# Patient Record
Sex: MALE | Race: BLACK OR AFRICAN AMERICAN | NOT HISPANIC OR LATINO | Employment: OTHER | RURAL
[De-identification: names, ages, dates, MRNs, and addresses within clinical notes are randomized per-mention and may not be internally consistent; named-entity substitution may affect disease eponyms.]

---

## 2021-05-29 ENCOUNTER — OUTSIDE PLACE OF SERVICE (OUTPATIENT)
Dept: FAMILY MEDICINE | Facility: CLINIC | Age: 66
End: 2021-05-29
Payer: MEDICARE

## 2021-05-29 PROCEDURE — 99307 PR NURSING FAC CARE, SUBSEQ, IMPROVING: ICD-10-PCS | Mod: ,,, | Performed by: FAMILY MEDICINE

## 2021-05-29 PROCEDURE — 99307 SBSQ NF CARE SF MDM 10: CPT | Mod: ,,, | Performed by: FAMILY MEDICINE

## 2021-07-28 ENCOUNTER — OUTSIDE PLACE OF SERVICE (OUTPATIENT)
Dept: FAMILY MEDICINE | Facility: CLINIC | Age: 66
End: 2021-07-28
Payer: MEDICARE

## 2021-07-28 PROCEDURE — 99307 PR NURSING FAC CARE, SUBSEQ, IMPROVING: ICD-10-PCS | Mod: ,,, | Performed by: FAMILY MEDICINE

## 2021-07-28 PROCEDURE — 99307 SBSQ NF CARE SF MDM 10: CPT | Mod: ,,, | Performed by: FAMILY MEDICINE

## 2021-09-12 ENCOUNTER — OUTSIDE PLACE OF SERVICE (OUTPATIENT)
Dept: FAMILY MEDICINE | Facility: CLINIC | Age: 66
End: 2021-09-12
Payer: MEDICARE

## 2021-09-12 PROCEDURE — 99307 PR NURSING FAC CARE, SUBSEQ, IMPROVING: ICD-10-PCS | Mod: ,,, | Performed by: FAMILY MEDICINE

## 2021-09-12 PROCEDURE — 99307 SBSQ NF CARE SF MDM 10: CPT | Mod: ,,, | Performed by: FAMILY MEDICINE

## 2021-11-22 ENCOUNTER — OUTSIDE PLACE OF SERVICE (OUTPATIENT)
Dept: FAMILY MEDICINE | Facility: CLINIC | Age: 66
End: 2021-11-22
Payer: MEDICARE

## 2021-11-22 PROCEDURE — 99307 SBSQ NF CARE SF MDM 10: CPT | Mod: ,,, | Performed by: FAMILY MEDICINE

## 2021-11-22 PROCEDURE — 99307 PR NURSING FAC CARE, SUBSEQ, IMPROVING: ICD-10-PCS | Mod: ,,, | Performed by: FAMILY MEDICINE

## 2022-01-16 ENCOUNTER — OUTSIDE PLACE OF SERVICE (OUTPATIENT)
Dept: FAMILY MEDICINE | Facility: CLINIC | Age: 67
End: 2022-01-16
Payer: MEDICARE

## 2022-01-16 PROCEDURE — 99307 PR NURSING FAC CARE, SUBSEQ, IMPROVING: ICD-10-PCS | Mod: ,,, | Performed by: FAMILY MEDICINE

## 2022-01-16 PROCEDURE — 99307 SBSQ NF CARE SF MDM 10: CPT | Mod: ,,, | Performed by: FAMILY MEDICINE

## 2022-03-15 ENCOUNTER — OUTSIDE PLACE OF SERVICE (OUTPATIENT)
Dept: FAMILY MEDICINE | Facility: CLINIC | Age: 67
End: 2022-03-15
Payer: MEDICARE

## 2022-03-15 PROCEDURE — 99307 PR NURSING FAC CARE, SUBSEQ, IMPROVING: ICD-10-PCS | Mod: ,,, | Performed by: FAMILY MEDICINE

## 2022-03-15 PROCEDURE — 99307 SBSQ NF CARE SF MDM 10: CPT | Mod: ,,, | Performed by: FAMILY MEDICINE

## 2022-05-21 ENCOUNTER — OUTSIDE PLACE OF SERVICE (OUTPATIENT)
Dept: FAMILY MEDICINE | Facility: CLINIC | Age: 67
End: 2022-05-21
Payer: MEDICARE

## 2022-05-21 PROCEDURE — 99307 PR NURSING FAC CARE, SUBSEQ, IMPROVING: ICD-10-PCS | Mod: ,,, | Performed by: FAMILY MEDICINE

## 2022-05-21 PROCEDURE — 99307 SBSQ NF CARE SF MDM 10: CPT | Mod: ,,, | Performed by: FAMILY MEDICINE

## 2022-07-31 ENCOUNTER — OUTSIDE PLACE OF SERVICE (OUTPATIENT)
Dept: FAMILY MEDICINE | Facility: CLINIC | Age: 67
End: 2022-07-31
Payer: MEDICARE

## 2022-07-31 PROCEDURE — 99309 PR NURSING FAC CARE, SUBSEQ, SIGNIF COMPLIC: ICD-10-PCS | Mod: ,,, | Performed by: FAMILY MEDICINE

## 2022-07-31 PROCEDURE — 99309 SBSQ NF CARE MODERATE MDM 30: CPT | Mod: ,,, | Performed by: FAMILY MEDICINE

## 2022-09-22 ENCOUNTER — OUTSIDE PLACE OF SERVICE (OUTPATIENT)
Dept: FAMILY MEDICINE | Facility: CLINIC | Age: 67
End: 2022-09-22
Payer: MEDICARE

## 2022-09-22 PROCEDURE — 99309 PR NURSING FAC CARE, SUBSEQ, SIGNIF COMPLIC: ICD-10-PCS | Mod: ,,, | Performed by: FAMILY MEDICINE

## 2022-09-22 PROCEDURE — 99309 SBSQ NF CARE MODERATE MDM 30: CPT | Mod: ,,, | Performed by: FAMILY MEDICINE

## 2022-11-27 ENCOUNTER — OUTSIDE PLACE OF SERVICE (OUTPATIENT)
Dept: FAMILY MEDICINE | Facility: CLINIC | Age: 67
End: 2022-11-27
Payer: MEDICARE

## 2022-11-27 PROCEDURE — 99309 SBSQ NF CARE MODERATE MDM 30: CPT | Mod: ,,, | Performed by: FAMILY MEDICINE

## 2022-11-27 PROCEDURE — 99309 PR NURSING FAC CARE, SUBSEQ, SIGNIF COMPLIC: ICD-10-PCS | Mod: ,,, | Performed by: FAMILY MEDICINE

## 2023-01-18 PROBLEM — Z85.46 PERSONAL HISTORY OF PROSTATE CANCER: Status: ACTIVE | Noted: 2023-01-18

## 2023-01-18 PROBLEM — Z87.440 HISTORY OF UTI: Status: ACTIVE | Noted: 2023-01-18

## 2023-01-18 PROBLEM — R31.9 HEMATURIA: Status: ACTIVE | Noted: 2023-01-18

## 2023-01-18 PROBLEM — Z86.73 HISTORY OF CARDIOEMBOLIC CEREBROVASCULAR ACCIDENT (CVA): Status: ACTIVE | Noted: 2023-01-18

## 2023-01-30 ENCOUNTER — OUTSIDE PLACE OF SERVICE (OUTPATIENT)
Dept: FAMILY MEDICINE | Facility: CLINIC | Age: 68
End: 2023-01-30
Payer: MEDICARE

## 2023-01-30 PROCEDURE — 99309 SBSQ NF CARE MODERATE MDM 30: CPT | Mod: ,,, | Performed by: FAMILY MEDICINE

## 2023-01-30 PROCEDURE — 99309 PR NURSING FAC CARE, SUBSEQ, SIGNIF COMPLIC: ICD-10-PCS | Mod: ,,, | Performed by: FAMILY MEDICINE

## 2023-03-22 ENCOUNTER — OUTSIDE PLACE OF SERVICE (OUTPATIENT)
Dept: FAMILY MEDICINE | Facility: CLINIC | Age: 68
End: 2023-03-22
Payer: MEDICARE

## 2023-03-22 PROCEDURE — 99309 SBSQ NF CARE MODERATE MDM 30: CPT | Mod: ,,, | Performed by: FAMILY MEDICINE

## 2023-03-22 PROCEDURE — 99309 PR NURSING FAC CARE, SUBSEQ, SIGNIF COMPLIC: ICD-10-PCS | Mod: ,,, | Performed by: FAMILY MEDICINE

## 2023-04-24 PROBLEM — Z87.440 HISTORY OF UTI: Status: RESOLVED | Noted: 2023-01-18 | Resolved: 2023-04-24

## 2023-05-23 ENCOUNTER — OUTSIDE PLACE OF SERVICE (OUTPATIENT)
Dept: FAMILY MEDICINE | Facility: CLINIC | Age: 68
End: 2023-05-23
Payer: MEDICARE

## 2023-05-23 PROCEDURE — 99309 SBSQ NF CARE MODERATE MDM 30: CPT | Mod: ,,, | Performed by: FAMILY MEDICINE

## 2023-05-23 PROCEDURE — 99309 PR NURSING FAC CARE, SUBSEQ, SIGNIF COMPLIC: ICD-10-PCS | Mod: ,,, | Performed by: FAMILY MEDICINE

## 2023-06-19 ENCOUNTER — OUTSIDE PLACE OF SERVICE (OUTPATIENT)
Dept: FAMILY MEDICINE | Facility: CLINIC | Age: 68
End: 2023-06-19
Payer: MEDICARE

## 2023-06-19 PROCEDURE — 99309 PR NURSING FAC CARE, SUBSEQ, SIGNIF COMPLIC: ICD-10-PCS | Mod: ,,, | Performed by: FAMILY MEDICINE

## 2023-06-19 PROCEDURE — 99309 SBSQ NF CARE MODERATE MDM 30: CPT | Mod: ,,, | Performed by: FAMILY MEDICINE

## 2023-10-11 ENCOUNTER — HOSPITAL ENCOUNTER (INPATIENT)
Facility: HOSPITAL | Age: 68
LOS: 3 days | Discharge: NURSING, REHAB OR LONG-TERM CARE | DRG: 689 | End: 2023-10-14
Attending: FAMILY MEDICINE | Admitting: FAMILY MEDICINE
Payer: MEDICARE

## 2023-10-11 DIAGNOSIS — R55 SYNCOPE: ICD-10-CM

## 2023-10-11 DIAGNOSIS — R78.81 BACTEREMIA DUE TO ESCHERICHIA COLI: Primary | ICD-10-CM

## 2023-10-11 DIAGNOSIS — R50.9 FEVER, UNSPECIFIED FEVER CAUSE: ICD-10-CM

## 2023-10-11 DIAGNOSIS — R41.82 ALTERED MENTAL STATUS, UNSPECIFIED ALTERED MENTAL STATUS TYPE: ICD-10-CM

## 2023-10-11 DIAGNOSIS — R31.9 HEMATURIA, UNSPECIFIED TYPE: ICD-10-CM

## 2023-10-11 DIAGNOSIS — Z86.73 HISTORY OF CARDIOEMBOLIC CEREBROVASCULAR ACCIDENT (CVA): ICD-10-CM

## 2023-10-11 DIAGNOSIS — B96.20 BACTEREMIA DUE TO ESCHERICHIA COLI: Primary | ICD-10-CM

## 2023-10-11 DIAGNOSIS — R07.9 CHEST PAIN: ICD-10-CM

## 2023-10-11 DIAGNOSIS — R41.82 ALTERED MENTAL STATUS: ICD-10-CM

## 2023-10-11 PROBLEM — G40.909 SEIZURE DISORDER: Status: ACTIVE | Noted: 2023-10-11

## 2023-10-11 LAB
ANION GAP SERPL CALCULATED.3IONS-SCNC: 13 MMOL/L (ref 7–16)
BACTERIA #/AREA URNS HPF: ABNORMAL /HPF
BASOPHILS # BLD AUTO: 0.04 K/UL (ref 0–0.2)
BASOPHILS NFR BLD AUTO: 0.2 % (ref 0–1)
BILIRUB UR QL STRIP: NEGATIVE
BUN SERPL-MCNC: 22 MG/DL (ref 7–18)
BUN/CREAT SERPL: 15 (ref 6–20)
CALCIUM SERPL-MCNC: 8.7 MG/DL (ref 8.5–10.1)
CHLORIDE SERPL-SCNC: 105 MMOL/L (ref 98–107)
CLARITY UR: ABNORMAL
CO2 SERPL-SCNC: 25 MMOL/L (ref 21–32)
COLOR UR: ABNORMAL
CREAT SERPL-MCNC: 1.47 MG/DL (ref 0.7–1.3)
CRP SERPL-MCNC: 10.9 MG/DL (ref 0–0.8)
D DIMER PPP FEU-MCNC: >20 ΜG/ML (ref 0–0.47)
DIFFERENTIAL METHOD BLD: ABNORMAL
EGFR (NO RACE VARIABLE) (RUSH/TITUS): 52 ML/MIN/1.73M2
EOSINOPHIL # BLD AUTO: 0 K/UL (ref 0–0.5)
EOSINOPHIL NFR BLD AUTO: 0 % (ref 1–4)
ERYTHROCYTE [DISTWIDTH] IN BLOOD BY AUTOMATED COUNT: 15.1 % (ref 11.5–14.5)
FLUAV AG UPPER RESP QL IA.RAPID: NEGATIVE
FLUBV AG UPPER RESP QL IA.RAPID: NEGATIVE
GLUCOSE SERPL-MCNC: 121 MG/DL (ref 70–105)
GLUCOSE SERPL-MCNC: 238 MG/DL (ref 74–106)
GLUCOSE UR STRIP-MCNC: NORMAL MG/DL
HCT VFR BLD AUTO: 47.8 % (ref 40–54)
HGB BLD-MCNC: 15.5 G/DL (ref 13.5–18)
IMM GRANULOCYTES # BLD AUTO: 0.23 K/UL (ref 0–0.04)
IMM GRANULOCYTES NFR BLD: 1 % (ref 0–0.4)
KETONES UR STRIP-SCNC: NEGATIVE MG/DL
LACTATE SERPL-SCNC: 1.7 MMOL/L (ref 0.4–2)
LACTATE SERPL-SCNC: 2.1 MMOL/L (ref 0.4–2)
LACTATE SERPL-SCNC: 3.6 MMOL/L (ref 0.4–2)
LEUKOCYTE ESTERASE UR QL STRIP: ABNORMAL
LYMPHOCYTES # BLD AUTO: 0.79 K/UL (ref 1–4.8)
LYMPHOCYTES NFR BLD AUTO: 3.5 % (ref 27–41)
MAGNESIUM SERPL-MCNC: 1.8 MG/DL (ref 1.7–2.3)
MCH RBC QN AUTO: 27.8 PG (ref 27–31)
MCHC RBC AUTO-ENTMCNC: 32.4 G/DL (ref 32–36)
MCV RBC AUTO: 85.8 FL (ref 80–96)
MONOCYTES # BLD AUTO: 1.51 K/UL (ref 0–0.8)
MONOCYTES NFR BLD AUTO: 6.8 % (ref 2–6)
MPC BLD CALC-MCNC: 11.9 FL (ref 9.4–12.4)
MUCOUS, UA: ABNORMAL /LPF
NEUTROPHILS # BLD AUTO: 19.74 K/UL (ref 1.8–7.7)
NEUTROPHILS NFR BLD AUTO: 88.5 % (ref 53–65)
NITRITE UR QL STRIP: NEGATIVE
NRBC # BLD AUTO: 0 X10E3/UL
NRBC, AUTO (.00): 0 %
PH UR STRIP: 6 PH UNITS
PHOSPHATE SERPL-MCNC: 1.7 MG/DL (ref 2.5–4.5)
PLATELET # BLD AUTO: 101 K/UL (ref 150–400)
POTASSIUM SERPL-SCNC: 3.2 MMOL/L (ref 3.5–5.1)
PROT UR QL STRIP: 100
RBC # BLD AUTO: 5.57 M/UL (ref 4.6–6.2)
RBC # UR STRIP: ABNORMAL /UL
RBC #/AREA URNS HPF: >182 /HPF
SARS-COV-2 RDRP RESP QL NAA+PROBE: NEGATIVE
SODIUM SERPL-SCNC: 140 MMOL/L (ref 136–145)
SP GR UR STRIP: 1.04
UROBILINOGEN UR STRIP-ACNC: NORMAL MG/DL
WBC # BLD AUTO: 22.31 K/UL (ref 4.5–11)
WBC #/AREA URNS HPF: >182 /HPF

## 2023-10-11 PROCEDURE — 93005 ELECTROCARDIOGRAM TRACING: CPT

## 2023-10-11 PROCEDURE — 63600175 PHARM REV CODE 636 W HCPCS: Performed by: FAMILY MEDICINE

## 2023-10-11 PROCEDURE — 25000003 PHARM REV CODE 250: Performed by: FAMILY MEDICINE

## 2023-10-11 PROCEDURE — 87040 BLOOD CULTURE FOR BACTERIA: CPT | Performed by: FAMILY MEDICINE

## 2023-10-11 PROCEDURE — 93010 ELECTROCARDIOGRAM REPORT: CPT | Mod: 76,,, | Performed by: STUDENT IN AN ORGANIZED HEALTH CARE EDUCATION/TRAINING PROGRAM

## 2023-10-11 PROCEDURE — 63600175 PHARM REV CODE 636 W HCPCS: Performed by: HOSPITALIST

## 2023-10-11 PROCEDURE — 84100 ASSAY OF PHOSPHORUS: CPT | Performed by: FAMILY MEDICINE

## 2023-10-11 PROCEDURE — 99285 PR EMERGENCY DEPT VISIT,LEVEL V: ICD-10-PCS | Mod: ,,, | Performed by: FAMILY MEDICINE

## 2023-10-11 PROCEDURE — 96375 TX/PRO/DX INJ NEW DRUG ADDON: CPT

## 2023-10-11 PROCEDURE — 87428 SARSCOV & INF VIR A&B AG IA: CPT | Performed by: FAMILY MEDICINE

## 2023-10-11 PROCEDURE — 96361 HYDRATE IV INFUSION ADD-ON: CPT

## 2023-10-11 PROCEDURE — 80048 BASIC METABOLIC PNL TOTAL CA: CPT | Performed by: FAMILY MEDICINE

## 2023-10-11 PROCEDURE — 85025 COMPLETE CBC W/AUTO DIFF WBC: CPT | Performed by: FAMILY MEDICINE

## 2023-10-11 PROCEDURE — 82962 GLUCOSE BLOOD TEST: CPT

## 2023-10-11 PROCEDURE — 86140 C-REACTIVE PROTEIN: CPT | Performed by: HOSPITALIST

## 2023-10-11 PROCEDURE — 87635 SARS-COV-2 COVID-19 AMP PRB: CPT | Performed by: FAMILY MEDICINE

## 2023-10-11 PROCEDURE — 83605 ASSAY OF LACTIC ACID: CPT | Performed by: FAMILY MEDICINE

## 2023-10-11 PROCEDURE — 93010 EKG 12-LEAD: ICD-10-PCS | Mod: ,,, | Performed by: STUDENT IN AN ORGANIZED HEALTH CARE EDUCATION/TRAINING PROGRAM

## 2023-10-11 PROCEDURE — 99285 EMERGENCY DEPT VISIT HI MDM: CPT | Mod: 25

## 2023-10-11 PROCEDURE — 99285 EMERGENCY DEPT VISIT HI MDM: CPT | Mod: ,,, | Performed by: FAMILY MEDICINE

## 2023-10-11 PROCEDURE — 83735 ASSAY OF MAGNESIUM: CPT | Performed by: FAMILY MEDICINE

## 2023-10-11 PROCEDURE — 87077 CULTURE AEROBIC IDENTIFY: CPT | Performed by: FAMILY MEDICINE

## 2023-10-11 PROCEDURE — 96376 TX/PRO/DX INJ SAME DRUG ADON: CPT

## 2023-10-11 PROCEDURE — 87804 INFLUENZA ASSAY W/OPTIC: CPT | Performed by: FAMILY MEDICINE

## 2023-10-11 PROCEDURE — 25000003 PHARM REV CODE 250

## 2023-10-11 PROCEDURE — 99223 PR INITIAL HOSPITAL CARE,LEVL III: ICD-10-PCS | Mod: ,,, | Performed by: HOSPITALIST

## 2023-10-11 PROCEDURE — 99223 1ST HOSP IP/OBS HIGH 75: CPT | Mod: ,,, | Performed by: HOSPITALIST

## 2023-10-11 PROCEDURE — 87149 DNA/RNA DIRECT PROBE: CPT | Performed by: FAMILY MEDICINE

## 2023-10-11 PROCEDURE — 11000001 HC ACUTE MED/SURG PRIVATE ROOM

## 2023-10-11 PROCEDURE — 25000003 PHARM REV CODE 250: Performed by: INTERNAL MEDICINE

## 2023-10-11 PROCEDURE — 96365 THER/PROPH/DIAG IV INF INIT: CPT

## 2023-10-11 PROCEDURE — 25000003 PHARM REV CODE 250: Performed by: HOSPITALIST

## 2023-10-11 PROCEDURE — 81001 URINALYSIS AUTO W/SCOPE: CPT | Performed by: FAMILY MEDICINE

## 2023-10-11 PROCEDURE — 51702 INSERT TEMP BLADDER CATH: CPT

## 2023-10-11 PROCEDURE — 93010 ELECTROCARDIOGRAM REPORT: CPT | Mod: ,,, | Performed by: STUDENT IN AN ORGANIZED HEALTH CARE EDUCATION/TRAINING PROGRAM

## 2023-10-11 PROCEDURE — 85379 FIBRIN DEGRADATION QUANT: CPT | Performed by: HOSPITALIST

## 2023-10-11 RX ORDER — MIRTAZAPINE 7.5 MG/1
7.5 TABLET, FILM COATED ORAL NIGHTLY
Status: DISCONTINUED | OUTPATIENT
Start: 2023-10-11 | End: 2023-10-14 | Stop reason: HOSPADM

## 2023-10-11 RX ORDER — ROSUVASTATIN CALCIUM 40 MG/1
40 TABLET, COATED ORAL DAILY
COMMUNITY
Start: 2023-09-11

## 2023-10-11 RX ORDER — LORAZEPAM 2 MG/ML
1 INJECTION INTRAMUSCULAR
Status: DISCONTINUED | OUTPATIENT
Start: 2023-10-11 | End: 2023-10-14 | Stop reason: HOSPADM

## 2023-10-11 RX ORDER — ACETAMINOPHEN 500 MG
1000 TABLET ORAL
Status: DISCONTINUED | OUTPATIENT
Start: 2023-10-11 | End: 2023-10-11

## 2023-10-11 RX ORDER — CIPROFLOXACIN 250 MG/1
250 TABLET, FILM COATED ORAL 2 TIMES DAILY
Status: ON HOLD | COMMUNITY
Start: 2023-10-05 | End: 2023-10-13 | Stop reason: HOSPADM

## 2023-10-11 RX ORDER — OXYCODONE HYDROCHLORIDE 5 MG/1
5 CAPSULE ORAL EVERY 4 HOURS PRN
COMMUNITY

## 2023-10-11 RX ORDER — SODIUM CHLORIDE AND POTASSIUM CHLORIDE 150; 450 MG/100ML; MG/100ML
INJECTION, SOLUTION INTRAVENOUS CONTINUOUS
Status: DISCONTINUED | OUTPATIENT
Start: 2023-10-11 | End: 2023-10-13

## 2023-10-11 RX ORDER — PROMETHAZINE HYDROCHLORIDE 25 MG/1
25 TABLET ORAL EVERY 6 HOURS PRN
Status: DISCONTINUED | OUTPATIENT
Start: 2023-10-11 | End: 2023-10-14 | Stop reason: HOSPADM

## 2023-10-11 RX ORDER — ACETAMINOPHEN 325 MG/1
650 TABLET ORAL
Status: COMPLETED | OUTPATIENT
Start: 2023-10-11 | End: 2023-10-11

## 2023-10-11 RX ORDER — NITROFURANTOIN MACROCRYSTALS 50 MG/1
50 CAPSULE ORAL DAILY
COMMUNITY
Start: 2023-10-04 | End: 2023-10-11 | Stop reason: CLARIF

## 2023-10-11 RX ORDER — AMOXICILLIN 250 MG
1 CAPSULE ORAL DAILY
COMMUNITY

## 2023-10-11 RX ORDER — LEVETIRACETAM 500 MG/1
1000 TABLET ORAL 2 TIMES DAILY
Status: DISCONTINUED | OUTPATIENT
Start: 2023-10-11 | End: 2023-10-14 | Stop reason: HOSPADM

## 2023-10-11 RX ORDER — METHOCARBAMOL 750 MG/1
750 TABLET, FILM COATED ORAL 3 TIMES DAILY
COMMUNITY
Start: 2023-09-06

## 2023-10-11 RX ORDER — METHENAMINE HIPPURATE 1000 MG/1
1 TABLET ORAL 2 TIMES DAILY
Status: ON HOLD | COMMUNITY
Start: 2023-09-25 | End: 2023-10-13 | Stop reason: HOSPADM

## 2023-10-11 RX ORDER — ERGOCALCIFEROL 1.25 MG/1
50000 CAPSULE ORAL
COMMUNITY

## 2023-10-11 RX ORDER — LEVETIRACETAM 500 MG/5ML
1000 INJECTION, SOLUTION, CONCENTRATE INTRAVENOUS
Status: COMPLETED | OUTPATIENT
Start: 2023-10-11 | End: 2023-10-11

## 2023-10-11 RX ORDER — GLUCAGON 1 MG
1 KIT INJECTION
Status: DISCONTINUED | OUTPATIENT
Start: 2023-10-11 | End: 2023-10-14 | Stop reason: HOSPADM

## 2023-10-11 RX ORDER — IBUPROFEN 200 MG
24 TABLET ORAL
Status: DISCONTINUED | OUTPATIENT
Start: 2023-10-11 | End: 2023-10-14 | Stop reason: HOSPADM

## 2023-10-11 RX ORDER — SODIUM CHLORIDE 0.9 % (FLUSH) 0.9 %
10 SYRINGE (ML) INJECTION EVERY 12 HOURS PRN
Status: DISCONTINUED | OUTPATIENT
Start: 2023-10-11 | End: 2023-10-14 | Stop reason: HOSPADM

## 2023-10-11 RX ORDER — MIRTAZAPINE 7.5 MG/1
7.5 TABLET, FILM COATED ORAL NIGHTLY
COMMUNITY
Start: 2023-09-21

## 2023-10-11 RX ORDER — LEVETIRACETAM 1000 MG/1
1000 TABLET ORAL 2 TIMES DAILY
COMMUNITY
Start: 2023-09-27

## 2023-10-11 RX ORDER — INSULIN ASPART 100 [IU]/ML
0-10 INJECTION, SOLUTION INTRAVENOUS; SUBCUTANEOUS
Status: DISCONTINUED | OUTPATIENT
Start: 2023-10-11 | End: 2023-10-14 | Stop reason: HOSPADM

## 2023-10-11 RX ORDER — LORAZEPAM 2 MG/ML
1 INJECTION INTRAMUSCULAR
Status: COMPLETED | OUTPATIENT
Start: 2023-10-11 | End: 2023-10-11

## 2023-10-11 RX ORDER — ACETAMINOPHEN 325 MG/1
650 TABLET ORAL EVERY 4 HOURS PRN
Status: DISCONTINUED | OUTPATIENT
Start: 2023-10-11 | End: 2023-10-14 | Stop reason: HOSPADM

## 2023-10-11 RX ORDER — LORAZEPAM 2 MG/ML
2 INJECTION INTRAMUSCULAR
Status: COMPLETED | OUTPATIENT
Start: 2023-10-11 | End: 2023-10-11

## 2023-10-11 RX ORDER — NALOXONE HCL 0.4 MG/ML
0.02 VIAL (ML) INJECTION
Status: DISCONTINUED | OUTPATIENT
Start: 2023-10-11 | End: 2023-10-14 | Stop reason: HOSPADM

## 2023-10-11 RX ORDER — SODIUM CHLORIDE 9 MG/ML
INJECTION, SOLUTION INTRAVENOUS
Status: COMPLETED
Start: 2023-10-11 | End: 2023-10-11

## 2023-10-11 RX ORDER — ONDANSETRON 2 MG/ML
4 INJECTION INTRAMUSCULAR; INTRAVENOUS EVERY 8 HOURS PRN
Status: DISCONTINUED | OUTPATIENT
Start: 2023-10-11 | End: 2023-10-14 | Stop reason: HOSPADM

## 2023-10-11 RX ORDER — IBUPROFEN 200 MG
16 TABLET ORAL
Status: DISCONTINUED | OUTPATIENT
Start: 2023-10-11 | End: 2023-10-14 | Stop reason: HOSPADM

## 2023-10-11 RX ADMIN — SODIUM CHLORIDE 1000 ML: 9 INJECTION, SOLUTION INTRAVENOUS at 12:10

## 2023-10-11 RX ADMIN — ACETAMINOPHEN 650 MG: 325 TABLET ORAL at 11:10

## 2023-10-11 RX ADMIN — SODIUM CHLORIDE 500 ML: 9 INJECTION, SOLUTION INTRAVENOUS at 10:10

## 2023-10-11 RX ADMIN — POTASSIUM CHLORIDE AND SODIUM CHLORIDE: 450; 150 INJECTION, SOLUTION INTRAVENOUS at 06:10

## 2023-10-11 RX ADMIN — LORAZEPAM 1 MG: 2 INJECTION INTRAMUSCULAR; INTRAVENOUS at 12:10

## 2023-10-11 RX ADMIN — PIPERACILLIN AND TAZOBACTAM 4.5 G: 4; .5 INJECTION, POWDER, LYOPHILIZED, FOR SOLUTION INTRAVENOUS; PARENTERAL at 03:10

## 2023-10-11 RX ADMIN — SODIUM CHLORIDE 100 ML: 9 INJECTION, SOLUTION INTRAVENOUS at 12:10

## 2023-10-11 RX ADMIN — LEVETIRACETAM 1000 MG: 100 INJECTION, SOLUTION INTRAVENOUS at 12:10

## 2023-10-11 RX ADMIN — LEVETIRACETAM 1000 MG: 500 TABLET, FILM COATED ORAL at 10:10

## 2023-10-11 RX ADMIN — LORAZEPAM 2 MG: 2 INJECTION, SOLUTION INTRAMUSCULAR; INTRAVENOUS at 01:10

## 2023-10-11 RX ADMIN — MIRTAZAPINE 7.5 MG: 7.5 TABLET, FILM COATED ORAL at 10:10

## 2023-10-11 NOTE — SUBJECTIVE & OBJECTIVE
Past Medical History:   Diagnosis Date    Diabetes mellitus     GERD (gastroesophageal reflux disease)     History of UTI 01/18/2023    No urine cultures available    Hypercholesterolemia     Hypertension     Malignant neoplasm of prostate     Seizures     Stroke        Past Surgical History:   Procedure Laterality Date    INSERTION OF SUPRAPUBIC CATHETER         Review of patient's allergies indicates:  Not on File    No current facility-administered medications on file prior to encounter.     Current Outpatient Medications on File Prior to Encounter   Medication Sig    ciprofloxacin HCl (CIPRO) 250 MG tablet Take 250 mg by mouth 2 (two) times daily.    ergocalciferol (VITAMIN D2) 50,000 unit Cap Take 50,000 Units by mouth every 7 days.    levETIRAcetam (KEPPRA) 1000 MG tablet Take 1,000 mg by mouth 2 (two) times daily.    methenamine (HIPREX) 1 gram Tab Take 1 g by mouth 2 (two) times daily.    methocarbamoL (ROBAXIN) 750 MG Tab Take 750 mg by mouth 3 (three) times daily.    mirtazapine (REMERON) 7.5 MG Tab Take 7.5 mg by mouth every evening.    oxyCODONE (OXY-IR) 5 mg Cap Take 5 mg by mouth every 4 (four) hours as needed for Pain.    rosuvastatin (CRESTOR) 40 MG Tab Take 40 mg by mouth once daily.    senna-docusate 8.6-50 mg (SENNA WITH DOCUSATE SODIUM) 8.6-50 mg per tablet Take 1 tablet by mouth once daily.    [DISCONTINUED] nitrofurantoin (MACRODANTIN) 50 MG capsule Take 50 mg by mouth once daily.     Family History    None       Tobacco Use    Smoking status: Never    Smokeless tobacco: Never   Substance and Sexual Activity    Alcohol use: Never    Drug use: Never    Sexual activity: Not on file     Review of Systems   Constitutional:  Negative for appetite change, fatigue and fever.   HENT:  Negative for congestion, hearing loss and trouble swallowing.    Respiratory:  Negative for chest tightness, shortness of breath and wheezing.    Cardiovascular:  Negative for chest pain and palpitations.    Gastrointestinal:  Negative for abdominal pain, constipation and nausea.   Genitourinary:  Positive for hematuria. Negative for difficulty urinating and dysuria.   Musculoskeletal:  Positive for gait problem. Negative for back pain and neck stiffness.   Skin:  Negative for pallor and rash.   Neurological:  Negative for dizziness, speech difficulty and headaches.   Psychiatric/Behavioral:  Negative for confusion and suicidal ideas.         Lethargy  Patient able to communicate with answering questions and follows simple commands  One-word answers     Objective:     Vital Signs (Most Recent):  Temp: (!) 101.9 °F (38.8 °C) (10/11/23 1329)  Pulse: 101 (10/11/23 1548)  Resp: (!) 23 (10/11/23 1548)  BP: 94/60 (10/11/23 1548)  SpO2: 97 % (10/11/23 1548) Vital Signs (24h Range):  Temp:  [101.8 °F (38.8 °C)-101.9 °F (38.8 °C)] 101.9 °F (38.8 °C)  Pulse:  [101-151] 101  Resp:  [20-46] 23  SpO2:  [91 %-97 %] 97 %  BP: ()/() 94/60     Weight: 91.9 kg (202 lb 9.6 oz)  Body mass index is 28.26 kg/m².     Physical Exam  Vitals reviewed.   Constitutional:       General: He is sleeping. He is not in acute distress.     Appearance: He is well-developed. He is not toxic-appearing.   HENT:      Head: Normocephalic.      Nose: Nose normal.      Mouth/Throat:      Pharynx: Oropharynx is clear.   Eyes:      Extraocular Movements: Extraocular movements intact.      Pupils: Pupils are equal, round, and reactive to light.   Neck:      Thyroid: No thyroid mass.      Vascular: No carotid bruit.   Cardiovascular:      Rate and Rhythm: Normal rate and regular rhythm.      Pulses: Normal pulses.      Heart sounds: Normal heart sounds. No murmur heard.  Pulmonary:      Effort: Pulmonary effort is normal.      Breath sounds: Normal breath sounds and air entry. No wheezing.   Abdominal:      General: Bowel sounds are normal. There is no distension.      Palpations: Abdomen is soft.      Tenderness: There is no abdominal tenderness.       Comments: Suprapubic catheter in lower abdomen   Genitourinary:     Comments: Blood noted in groin area from previous bleeding but no active bleeding at time seen  Musculoskeletal:         General: Normal range of motion.      Cervical back: Neck supple. No rigidity.   Skin:     General: Skin is warm.      Coloration: Skin is not jaundiced.      Findings: No lesion.   Neurological:      General: No focal deficit present.      Mental Status: He is lethargic.      Cranial Nerves: No cranial nerve deficit.      Gait: Gait abnormal.      Comments: Fairly dense paralysis on left side    Will awake and answer questions  Answers 1 word at a time  Is in seems a little sleepy   Psychiatric:         Attention and Perception: Attention normal.         Mood and Affect: Mood normal.         Behavior: Behavior normal. Behavior is cooperative.         Thought Content: Thought content normal.         Cognition and Memory: Cognition normal.              CRANIAL NERVES     CN III, IV, VI   Pupils are equal, round, and reactive to light.       Significant Labs: All pertinent labs within the past 24 hours have been reviewed.  BMP:   Recent Labs   Lab 10/11/23  1135   *      K 3.2*      CO2 25   BUN 22*   CREATININE 1.47*   CALCIUM 8.7   MG 1.8     CBC:   Recent Labs   Lab 10/11/23  1135   WBC 22.31*   HGB 15.5   HCT 47.8   *     CMP:   Recent Labs   Lab 10/11/23  1135      K 3.2*      CO2 25   *   BUN 22*   CREATININE 1.47*   CALCIUM 8.7   ANIONGAP 13       Significant Imaging: I have reviewed all pertinent imaging results/findings within the past 24 hours.    Imaging Results              X-Ray Abdomen AP 1 View (In process)                      CT Head Without Contrast (Final result)  Result time 10/11/23 13:17:43      Final result by Ulysses Roberts II, MD (10/11/23 13:17:43)                   Impression:      No evidence of abnormality demonstrated.      Electronically signed  by: Ulysses Roberts  Date:    10/11/2023  Time:    13:17               Narrative:    EXAMINATION:  CT HEAD WITHOUT CONTRAST    CLINICAL HISTORY:  Altered mental status, nontraumatic (Ped 0-18y);    TECHNIQUE:  Axial CT imaging of the brain is performed without contrast with 3 mm increments.    CT dose reduction technique used - Dose Rite and tube current modulation.    COMPARISON:  24 March 2019    FINDINGS:  No evidence of hemorrhage, mass, mass effect, midline shift or acute infarct seen.  Chronic changes in volume loss more prominent in the right cerebral hemisphere similar to previous CT otherwise the brain parenchyma attenuation and differentiation appears stable.  The ventricles and cisterns are unchanged in caliber.  Craniotomy changes and aneurysm clip on the right appears stable.  Cranial or skull base abnormality is identified.                                       X-Ray Chest 1 View (Final result)  Result time 10/11/23 11:22:28      Final result by Ulysses Roberts II, MD (10/11/23 11:22:28)                   Impression:      Findings suggest mild cardiac decompensation and / or pneumonitis.      Electronically signed by: Ulysses Roberts  Date:    10/11/2023  Time:    11:22               Narrative:    EXAMINATION:  XR CHEST 1 VIEW    CLINICAL HISTORY:  Sepsis;    COMPARISON:  17 June 2019    TECHNIQUE:  XR CHEST 1 VIEW    FINDINGS:  The heart and mediastinum are stable in size and configuration.  The pulmonary vascularity is slightly increased with bilateral increased interstitial lung density.  No other lung infiltrates, effusions, pneumothorax or other abnormality is demonstrated.                                    Intake/Output - Last 3 Shifts         10/09 0700  10/10 0659 10/10 0700  10/11 0659 10/11 0700  10/12 0659    IV Piggyback   1000    Total Intake(mL/kg)   1000 (10.9)    Net   +1000                 Microbiology Results (last 7 days)       Procedure Component Value Units Date/Time    Urine  culture [[id number]] Collected: 10/11/23 1628    Order Status: Sent Specimen: Urine Updated: 10/11/23 1706    Rapid Influenza A/B [[id number]]  (Normal) Collected: 10/11/23 1214    Order Status: Completed Specimen: Nasopharyngeal Updated: 10/11/23 1402     Influenza A Negative     Influenza B Negative    Blood culture x two cultures. Draw prior to antibiotics [[id number]] Collected: 10/11/23 1146    Order Status: Sent Specimen: Blood Updated: 10/11/23 1148    Blood culture x two cultures. Draw prior to antibiotics [[id number]] Collected: 10/11/23 1135    Order Status: Sent Specimen: Blood Updated: 10/11/23 1141

## 2023-10-11 NOTE — H&P
[facility] - Emergency Department  Hospital Medicine  History & Physical    Patient Name: James Calero  MRN: [id number]  Patient Class: IP- Inpatient  Admission Date: 10/11/2023  Attending Physician: Bipin Montes De Oca MD   Primary Care Provider: Giovanni Wu IV, MD         Patient information was obtained from patient, relative(s), caregiver / friend, past medical records and ER records.     Subjective:     Principal Problem:<principal problem not specified>    Chief Complaint:   Chief Complaint   Patient presents with    Altered Mental Status        HPI: Chief complaint:  Altered mental status and bleeding from penis    History of present illness:      Mr. Calero referred to emergency room from [facility] with history of penile bleeding.  Had had some recent altered mental status.  Family states patient is normally able to converse well and help with self-care.  Been less talkative and more sleepy recently.  Had bleeding from penis.  Patient has history of prostate surgery for prostate cancer 20 years earlier done at this facility.  No radiation therapy apparently required.  Patient has had treatment for urinary tract infections several times recently.  Has long history of Gonzalez catheter secondary to retention.  Had split in tip of penis from longstanding Gonzalez catheter.  Followed at [facility].  Family states patient had suprapubic catheter conversion 4 months ago.  When seen in the emergency room was noted to have elevation of fever.  Bleeding from penis area which was addressed by Urology and since stopped.  Had apparent witnessed seizure in the ER.  Hospitalist service asked to admit.     Review of systems:  See above.  Family states patient is generally able to converse, recognize family members and watch TV.  He is able to feed himself.  Requires help otherwise with activities of daily living.  Had CVA with significant left-sided weakness 7 years earlier and has not been  ambulatory since.  Snores but no history of previous diagnosis of obstructive sleep apnea.  Review of systems otherwise.    Past medical history:  -cerebrovascular accident with left-sided hemiparesis 7 years earlier (patient is right-handed)  -prostate cancer treated 20 years earlier with surgery  -neurogenic bladder with longstanding Gonzalez catheter recently converted into suprapubic catheter  -recurrent / chronic UTIs  -seizure disorder, seizure manifest by staring into space and occasionally brief loss of consciousness with convulsions.  No tongue biting    Past surgical history:  Surgery for prostate cancer    Allergy to sulfa    Social history:  Nursing home placement for last 7 years  Common law wife helps with patient's decision  Patient has children    Family history:  No one with premature coronary artery disease  Sister on dialysis  Brother on dialysis and also has diabetes    Health maintenance issues:  Family states patient gets all required vaccines at the nursing home and generally gets a flu shot every year and has had all the COVID vaccinations recommended by the nursing home  Patient had COVID-19 in 2020  No prior colonoscopies  Family hospitals patient is a full code            Past Medical History:   Diagnosis Date    Diabetes mellitus     GERD (gastroesophageal reflux disease)     History of UTI 01/18/2023    No urine cultures available    Hypercholesterolemia     Hypertension     Malignant neoplasm of prostate     Seizures     Stroke        Past Surgical History:   Procedure Laterality Date    INSERTION OF SUPRAPUBIC CATHETER         Review of patient's allergies indicates:  Not on File    No current facility-administered medications on file prior to encounter.     Current Outpatient Medications on File Prior to Encounter   Medication Sig    ciprofloxacin HCl (CIPRO) 250 MG tablet Take 250 mg by mouth 2 (two) times daily.    ergocalciferol (VITAMIN D2) 50,000 unit Cap Take 50,000 Units  by mouth every 7 days.    levETIRAcetam (KEPPRA) 1000 MG tablet Take 1,000 mg by mouth 2 (two) times daily.    methenamine (HIPREX) 1 gram Tab Take 1 g by mouth 2 (two) times daily.    methocarbamoL (ROBAXIN) 750 MG Tab Take 750 mg by mouth 3 (three) times daily.    mirtazapine (REMERON) 7.5 MG Tab Take 7.5 mg by mouth every evening.    oxyCODONE (OXY-IR) 5 mg Cap Take 5 mg by mouth every 4 (four) hours as needed for Pain.    rosuvastatin (CRESTOR) 40 MG Tab Take 40 mg by mouth once daily.    senna-docusate 8.6-50 mg (SENNA WITH DOCUSATE SODIUM) 8.6-50 mg per tablet Take 1 tablet by mouth once daily.    [DISCONTINUED] nitrofurantoin (MACRODANTIN) 50 MG capsule Take 50 mg by mouth once daily.     Family History    None       Tobacco Use    Smoking status: Never    Smokeless tobacco: Never   Substance and Sexual Activity    Alcohol use: Never    Drug use: Never    Sexual activity: Not on file     Review of Systems   Constitutional:  Negative for appetite change, fatigue and fever.   HENT:  Negative for congestion, hearing loss and trouble swallowing.    Respiratory:  Negative for chest tightness, shortness of breath and wheezing.    Cardiovascular:  Negative for chest pain and palpitations.   Gastrointestinal:  Negative for abdominal pain, constipation and nausea.   Genitourinary:  Positive for hematuria. Negative for difficulty urinating and dysuria.   Musculoskeletal:  Positive for gait problem. Negative for back pain and neck stiffness.   Skin:  Negative for pallor and rash.   Neurological:  Negative for dizziness, speech difficulty and headaches.   Psychiatric/Behavioral:  Negative for confusion and suicidal ideas.         Lethargy  Patient able to communicate with answering questions and follows simple commands  One-word answers     Objective:     Vital Signs (Most Recent):  Temp: (!) 101.9 °F (38.8 °C) (10/11/23 1329)  Pulse: 101 (10/11/23 1548)  Resp: (!) 23 (10/11/23 1548)  BP: 94/60 (10/11/23  1548)  SpO2: 97 % (10/11/23 1548) Vital Signs (24h Range):  Temp:  [101.8 °F (38.8 °C)-101.9 °F (38.8 °C)] 101.9 °F (38.8 °C)  Pulse:  [101-151] 101  Resp:  [20-46] 23  SpO2:  [91 %-97 %] 97 %  BP: ()/() 94/60     Weight: 91.9 kg (202 lb 9.6 oz)  Body mass index is 28.26 kg/m².     Physical Exam  Vitals reviewed.   Constitutional:       General: He is sleeping. He is not in acute distress.     Appearance: He is well-developed. He is not toxic-appearing.   HENT:      Head: Normocephalic.      Nose: Nose normal.      Mouth/Throat:      Pharynx: Oropharynx is clear.   Eyes:      Extraocular Movements: Extraocular movements intact.      Pupils: Pupils are equal, round, and reactive to light.   Neck:      Thyroid: No thyroid mass.      Vascular: No carotid bruit.   Cardiovascular:      Rate and Rhythm: Normal rate and regular rhythm.      Pulses: Normal pulses.      Heart sounds: Normal heart sounds. No murmur heard.  Pulmonary:      Effort: Pulmonary effort is normal.      Breath sounds: Normal breath sounds and air entry. No wheezing.   Abdominal:      General: Bowel sounds are normal. There is no distension.      Palpations: Abdomen is soft.      Tenderness: There is no abdominal tenderness.      Comments: Suprapubic catheter in lower abdomen   Genitourinary:     Comments: Blood noted in groin area from previous bleeding but no active bleeding at time seen  Musculoskeletal:         General: Normal range of motion.      Cervical back: Neck supple. No rigidity.   Skin:     General: Skin is warm.      Coloration: Skin is not jaundiced.      Findings: No lesion.   Neurological:      General: No focal deficit present.      Mental Status: He is lethargic.      Cranial Nerves: No cranial nerve deficit.      Gait: Gait abnormal.      Comments: Fairly dense paralysis on left side    Will awake and answer questions  Answers 1 word at a time  Is in seems a little sleepy   Psychiatric:         Attention and  Perception: Attention normal.         Mood and Affect: Mood normal.         Behavior: Behavior normal. Behavior is cooperative.         Thought Content: Thought content normal.         Cognition and Memory: Cognition normal.              CRANIAL NERVES     CN III, IV, VI   Pupils are equal, round, and reactive to light.       Significant Labs: All pertinent labs within the past 24 hours have been reviewed.  BMP:   Recent Labs   Lab 10/11/23  1135   *      K 3.2*      CO2 25   BUN 22*   CREATININE 1.47*   CALCIUM 8.7   MG 1.8     CBC:   Recent Labs   Lab 10/11/23  1135   WBC 22.31*   HGB 15.5   HCT 47.8   *     CMP:   Recent Labs   Lab 10/11/23  1135      K 3.2*      CO2 25   *   BUN 22*   CREATININE 1.47*   CALCIUM 8.7   ANIONGAP 13       Significant Imaging: I have reviewed all pertinent imaging results/findings within the past 24 hours.    Imaging Results              X-Ray Abdomen AP 1 View (In process)                      CT Head Without Contrast (Final result)  Result time 10/11/23 13:17:43      Final result by Ulysses Roberts II, MD (10/11/23 13:17:43)                   Impression:      No evidence of abnormality demonstrated.      Electronically signed by: Ulysses Roberts  Date:    10/11/2023  Time:    13:17               Narrative:    EXAMINATION:  CT HEAD WITHOUT CONTRAST    CLINICAL HISTORY:  Altered mental status, nontraumatic (Ped 0-18y);    TECHNIQUE:  Axial CT imaging of the brain is performed without contrast with 3 mm increments.    CT dose reduction technique used - Dose Rite and tube current modulation.    COMPARISON:  24 March 2019    FINDINGS:  No evidence of hemorrhage, mass, mass effect, midline shift or acute infarct seen.  Chronic changes in volume loss more prominent in the right cerebral hemisphere similar to previous CT otherwise the brain parenchyma attenuation and differentiation appears stable.  The ventricles and cisterns are unchanged  in caliber.  Craniotomy changes and aneurysm clip on the right appears stable.  Cranial or skull base abnormality is identified.                                       X-Ray Chest 1 View (Final result)  Result time 10/11/23 11:22:28      Final result by Ulysses Roberts II, MD (10/11/23 11:22:28)                   Impression:      Findings suggest mild cardiac decompensation and / or pneumonitis.      Electronically signed by: Ulysses Roberts  Date:    10/11/2023  Time:    11:22               Narrative:    EXAMINATION:  XR CHEST 1 VIEW    CLINICAL HISTORY:  Sepsis;    COMPARISON:  17 June 2019    TECHNIQUE:  XR CHEST 1 VIEW    FINDINGS:  The heart and mediastinum are stable in size and configuration.  The pulmonary vascularity is slightly increased with bilateral increased interstitial lung density.  No other lung infiltrates, effusions, pneumothorax or other abnormality is demonstrated.                                    Intake/Output - Last 3 Shifts         10/09 0700  10/10 0659 10/10 0700  10/11 0659 10/11 0700  10/12 0659    IV Piggyback   1000    Total Intake(mL/kg)   1000 (10.9)    Net   +1000                 Microbiology Results (last 7 days)       Procedure Component Value Units Date/Time    Urine culture [[id number]] Collected: 10/11/23 1628    Order Status: Sent Specimen: Urine Updated: 10/11/23 1706    Rapid Influenza A/B [[id number]]  (Normal) Collected: 10/11/23 1214    Order Status: Completed Specimen: Nasopharyngeal Updated: 10/11/23 1402     Influenza A Negative     Influenza B Negative    Blood culture x two cultures. Draw prior to antibiotics [[id number]] Collected: 10/11/23 1146    Order Status: Sent Specimen: Blood Updated: 10/11/23 1148    Blood culture x two cultures. Draw prior to antibiotics [[id number]] Collected: 10/11/23 1135    Order Status: Sent Specimen: Blood Updated: 10/11/23 1141              Assessment/Plan:     Seizure disorder    Witnessed seizure in the emergency  department  Family is not aware of any recent seizures before   Seizures 1st started more than 15 years ago even prior to strokes  Will obtain EEG  Continue with patient's Keppra  I have discussed with family that we do not have neurologist and if seizures become uncontrolled patient may need to be transferred  Family were okay with patient being admitted here    History of cardioembolic cerebrovascular accident (CVA)    Left-sided weakness  Nonambulatory  (patient is right-handed)    Hematuria    Addressed by Urology    Personal history of prostate cancer    Treated more than 20 years ago by surgery at this facility      VTE Risk Mitigation (From admission, onward)         Ordered     IP VTE LOW RISK PATIENT  Once         10/11/23 1738     Place sequential compression device  Until discontinued         10/11/23 1738                           Bipin Montes De Oca MD  Department of Hospital Medicine  [facility] - Emergency Department

## 2023-10-11 NOTE — ED PROVIDER NOTES
Encounter Date: 10/11/2023       History     Chief Complaint   Patient presents with    Altered Mental Status     Patient is see stitch years old who comes in from nursing home facility altered mental status.  Seems that he is having a focal seizure mainly on the left.  He is able to talk and converse while he is having the focal seizure.  He is on Keppra at present.  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  He has fever 101.9 tachycardia.  Some bleeding from his hypospadias and a suprapubic catheter.        Review of patient's allergies indicates:   Allergen Reactions    Sulfadiazine (bulk)      Past Medical History:   Diagnosis Date    Diabetes mellitus     GERD (gastroesophageal reflux disease)     History of UTI 01/18/2023    No urine cultures available    Hypercholesterolemia     Hypertension     Malignant neoplasm of prostate     Seizures     Stroke      Past Surgical History:   Procedure Laterality Date    INSERTION OF SUPRAPUBIC CATHETER       History reviewed. No pertinent family history.  Social History     Tobacco Use    Smoking status: Never    Smokeless tobacco: Never   Substance Use Topics    Alcohol use: Never    Drug use: Never     Review of Systems   Constitutional:  Positive for fatigue. Negative for fever.   HENT: Negative.  Negative for sore throat.    Eyes: Negative.    Respiratory: Negative.  Negative for shortness of breath.    Cardiovascular: Negative.  Negative for chest pain.   Gastrointestinal: Negative.  Negative for nausea.   Endocrine: Negative.    Genitourinary: Negative.  Negative for dysuria.   Musculoskeletal: Negative.  Negative for back pain.   Skin: Negative.  Negative for rash.   Allergic/Immunologic: Negative.    Neurological:  Positive for seizures. Negative for weakness.        Focal seizure activity fevers.   Hematological: Negative.  Does not bruise/bleed easily.   Psychiatric/Behavioral: Negative.         Physical  Exam     Initial Vitals [10/11/23 1048]   BP Pulse Resp Temp SpO2   (!) 166/108 (!) 151 (!) 43 (!) 101.8 °F (38.8 °C) (!) 91 %      MAP       --         Physical Exam    Constitutional: He appears well-developed and well-nourished.   HENT:   Head: Normocephalic and atraumatic.   Right Ear: External ear normal.   Left Ear: External ear normal.   Nose: Nose normal.   Mouth/Throat: Oropharynx is clear and moist.   Eyes: Conjunctivae and EOM are normal. Pupils are equal, round, and reactive to light.   Neck: Neck supple.   Normal range of motion.  Cardiovascular:  Normal rate, regular rhythm, normal heart sounds and intact distal pulses.           Pulmonary/Chest: Breath sounds normal.   Abdominal: Abdomen is soft. Bowel sounds are normal.   Genitourinary:    Prostate and penis normal.      Genitourinary Comments: Patient with profuse bleeding from the proximal in the hypospadias.  His wife states that he is had to have this repaired in [place] at 1 point.     Musculoskeletal:         General: Normal range of motion.      Cervical back: Normal range of motion and neck supple.     Neurological: He is alert and oriented to person, place, and time. He has normal strength and normal reflexes.   Skin: Skin is warm and dry.   Psychiatric: He has a normal mood and affect. His behavior is normal. Judgment and thought content normal.         Medical Screening Exam   See Full Note    ED Course   Procedures  Labs Reviewed   LACTIC ACID, PLASMA - Abnormal; Notable for the following components:       Result Value    Lactic Acid 3.6 (*)     All other components within normal limits   PHOSPHORUS - Abnormal; Notable for the following components:    Phosphorus 1.7 (*)     All other components within normal limits   URINALYSIS, REFLEX TO URINE CULTURE - Abnormal; Notable for the following components:    Color, UA Dark-Red (*)     Leukocytes, UA Large (*)     Protein,  (*)     Blood, UA Large (*)     Specific Gravity, UA 1.038  (*)     All other components within normal limits   CBC WITH DIFFERENTIAL - Abnormal; Notable for the following components:    WBC 22.31 (*)     RDW 15.1 (*)     Platelet Count 101 (*)     Neutrophils % 88.5 (*)     Lymphocytes % 3.5 (*)     Monocytes % 6.8 (*)     Eosinophils % 0.0 (*)     Immature Granulocytes % 1.0 (*)     Neutrophils, Abs 19.74 (*)     Lymphocytes, Absolute 0.79 (*)     Monocytes, Absolute 1.51 (*)     Immature Granulocytes, Absolute 0.23 (*)     All other components within normal limits   BASIC METABOLIC PANEL - Abnormal; Notable for the following components:    Potassium 3.2 (*)     Glucose 238 (*)     BUN 22 (*)     Creatinine 1.47 (*)     eGFR 52 (*)     All other components within normal limits   LACTIC ACID, PLASMA - Abnormal; Notable for the following components:    Lactic Acid 2.1 (*)     All other components within normal limits   URINALYSIS, MICROSCOPIC - Abnormal; Notable for the following components:    WBC, UA >182 (*)     RBC, UA >182 (*)     Bacteria, UA Few (*)     Mucous Occasional (*)     All other components within normal limits   C-REACTIVE PROTEIN - Abnormal; Notable for the following components:    CRP 10.90 (*)     All other components within normal limits   POCT GLUCOSE MONITORING CONTINUOUS - Abnormal; Notable for the following components:    POC Glucose 121 (*)     All other components within normal limits   RAPID INFLUENZA A/B - Normal   MAGNESIUM - Normal   SARS-COV-2 RNA AMPLIFICATION, QUAL - Normal    Narrative:     Negative SARS-CoV results should not be used as the sole basis for treatment or patient management decisions; negative results should be considered in the context of a patient's recent exposures, history and the presene of clinical signs and symptoms consistent with COVID-19.  Negative results should be treated as presumptive and confirmed by molecular assay, if necessary for patient management.   LACTIC ACID, PLASMA - Normal   CBC W/ AUTO DIFFERENTIAL     Narrative:     The following orders were created for panel order CBC auto differential.  Procedure                               Abnormality         Status                     ---------                               -----------         ------                     CBC with Differential[[id number]]       Abnormal            Final result                 Please view results for these tests on the individual orders.   POCT GLUCOSE MONITORING CONTINUOUS          Imaging Results              X-Ray Abdomen AP 1 View (Final result)  Result time 10/11/23 19:02:51      Final result by Ulysses Roberts II, MD (10/11/23 19:02:51)                   Impression:      Increased stool volume in the colon, may indicate constipation.      Electronically signed by: Ulysses Roberts  Date:    10/11/2023  Time:    19:02               Narrative:    EXAMINATION:  XR ABDOMEN AP 1 VIEW    CLINICAL HISTORY:  Constipation;    COMPARISON:  30 October 2019    FINDINGS:  No free fluid or free air seen.  Increased stool volume is seen in the colon.  The bowel gas pattern otherwise appears within normal limits.  No abnormal calcifications are present.  No other abnormality is identified.                                       CT Head Without Contrast (Final result)  Result time 10/11/23 13:17:43      Final result by Ulysses Roberts II, MD (10/11/23 13:17:43)                   Impression:      No evidence of abnormality demonstrated.      Electronically signed by: Ulysses Roberts  Date:    10/11/2023  Time:    13:17               Narrative:    EXAMINATION:  CT HEAD WITHOUT CONTRAST    CLINICAL HISTORY:  Altered mental status, nontraumatic (Ped 0-18y);    TECHNIQUE:  Axial CT imaging of the brain is performed without contrast with 3 mm increments.    CT dose reduction technique used - Dose Rite and tube current modulation.    COMPARISON:  24 March 2019    FINDINGS:  No evidence of hemorrhage, mass, mass effect, midline shift or acute infarct  seen.  Chronic changes in volume loss more prominent in the right cerebral hemisphere similar to previous CT otherwise the brain parenchyma attenuation and differentiation appears stable.  The ventricles and cisterns are unchanged in caliber.  Craniotomy changes and aneurysm clip on the right appears stable.  Cranial or skull base abnormality is identified.                                       X-Ray Chest 1 View (Final result)  Result time 10/11/23 11:22:28      Final result by Ulysses Roberts II, MD (10/11/23 11:22:28)                   Impression:      Findings suggest mild cardiac decompensation and / or pneumonitis.      Electronically signed by: Ulysses Roberts  Date:    10/11/2023  Time:    11:22               Narrative:    EXAMINATION:  XR CHEST 1 VIEW    CLINICAL HISTORY:  Sepsis;    COMPARISON:  17 June 2019    TECHNIQUE:  XR CHEST 1 VIEW    FINDINGS:  The heart and mediastinum are stable in size and configuration.  The pulmonary vascularity is slightly increased with bilateral increased interstitial lung density.  No other lung infiltrates, effusions, pneumothorax or other abnormality is demonstrated.                                       Medications   levETIRAcetam tablet 1,000 mg (1,000 mg Oral Given 10/12/23 1052)   mirtazapine tablet 7.5 mg (7.5 mg Oral Given 10/11/23 2225)   sodium chloride 0.9% flush 10 mL (has no administration in time range)   naloxone 0.4 mg/mL injection 0.02 mg (has no administration in time range)   acetaminophen tablet 650 mg (has no administration in time range)   ondansetron injection 4 mg (has no administration in time range)   promethazine tablet 25 mg (has no administration in time range)   LORazepam injection 1 mg (has no administration in time range)   glucose chewable tablet 16 g (has no administration in time range)   glucose chewable tablet 24 g (has no administration in time range)   glucagon (human recombinant) injection 1 mg (has no administration in time  range)   insulin aspart U-100 injection 0-10 Units (0 Units Subcutaneous Not Given 10/12/23 1217)   dextrose 10% bolus 125 mL 125 mL (has no administration in time range)   dextrose 10% bolus 250 mL 250 mL (has no administration in time range)   0.45% NaCl with KCl 20 mEq infusion (0 mL/hr Intravenous Paused 10/12/23 1050)   meropenem (MERREM) 1 g in sodium chloride 0.9 % 100 mL IVPB (MB+) (0 g Intravenous Stopped 10/12/23 1215)   acetaminophen tablet 650 mg (650 mg Oral Given 10/11/23 1130)   sodium chloride 0.9% bolus 1,000 mL 1,000 mL (0 mLs Intravenous Stopped 10/11/23 1300)   LORazepam injection 1 mg (1 mg Intravenous Given 10/11/23 1207)   levETIRAcetam injection 1,000 mg (1,000 mg Intravenous Given 10/11/23 1209)   sodium chloride 0.9% infusion (0 mL/hr  Stopped 10/11/23 1239)   piperacillin-tazobactam (ZOSYN) 4.5 g in dextrose 5 % in water (D5W) 100 mL IVPB (MB+) (0 g Intravenous Stopped 10/11/23 1610)   LORazepam injection 2 mg (2 mg Intravenous Given 10/11/23 1300)   sodium chloride 0.9% bolus 500 mL 500 mL (0 mLs Intravenous Stopped 10/11/23 2345)     Medical Decision Making  Amount and/or Complexity of Data Reviewed  Labs: ordered.  Radiology: ordered.    Risk  OTC drugs.  Prescription drug management.  Decision regarding hospitalization.                          Medical Decision Making:   Initial Assessment:   Patient comes in with fever underlying sepsis.  Suprapubic catheter.  Bleeding from the hypospadias region of the penis.  And focal seizure-like activity  Differential Diagnosis:   Discussed this case with Dr. Malloy he will be coming to evaluate the bleeding of the proximal penis.  I replaced the Gonzalez catheter in the suprapubic region.  Patient tolerated procedure well this was all done under sterile technique patient was started on Zosyn.  Blood cultures had been obtained still waiting on urine culture discussed with weight who stated that the urine culture was more likely contaminated anyway  since he had a suprapubic catheter.  ED Management:  After a L ns  and Keppra decreasing the temperature the patient has focal seizures have resolved--      Clinical Impression:   Final diagnoses:  [R41.82] Altered mental status, unspecified altered mental status type (Primary)  [R50.9] Fever, unspecified fever cause  [R41.82] Altered mental status        ED Disposition Condition    Admit                 Brett Joseph, DO  10/12/23 2795

## 2023-10-11 NOTE — HPI
Chief complaint:  Altered mental status and bleeding from penis    History of present illness:      Mr. Calero referred to emergency room from [facility] with history of penile bleeding.  Had had some recent altered mental status.  Family states patient is normally able to converse well and help with self-care.  Been less talkative and more sleepy recently.  Had bleeding from penis.  Patient has history of prostate surgery for prostate cancer 20 years earlier done at this facility.  No radiation therapy apparently required.  Patient has had treatment for urinary tract infections several times recently.  Has long history of Gonzalez catheter secondary to retention.  Had split in tip of penis from longstanding Gonzalez catheter.  Followed at [facility].  Family states patient had suprapubic catheter conversion 4 months ago.  When seen in the emergency room was noted to have elevation of fever.  Bleeding from penis area which was addressed by Urology and since stopped.  Had apparent witnessed seizure in the ER.  Hospitalist service asked to admit.     Review of systems:  See above.  Family states patient is generally able to converse, recognize family members and watch TV.  He is able to feed himself.  Requires help otherwise with activities of daily living.  Had CVA with significant left-sided weakness 7 years earlier and has not been ambulatory since.  Snores but no history of previous diagnosis of obstructive sleep apnea.  Review of systems otherwise.    Past medical history:  -cerebrovascular accident with left-sided hemiparesis 7 years earlier (patient is right-handed)  -prostate cancer treated 20 years earlier with surgery  -neurogenic bladder with longstanding Gonzalez catheter recently converted into suprapubic catheter  -recurrent / chronic UTIs  -seizure disorder, seizure manifest by staring into space and occasionally brief loss of consciousness with convulsions.  No tongue biting    Past surgical  history:  Surgery for prostate cancer    Allergy to sulfa    Social history:  Nursing home placement for last 7 years  Common law wife helps with patient's decision  Patient has children    Family history:  No one with premature coronary artery disease  Sister on dialysis  Brother on dialysis and also has diabetes    Health maintenance issues:  Family states patient gets all required vaccines at the nursing home and generally gets a flu shot every year and has had all the COVID vaccinations recommended by the nursing home  Patient had COVID-19 in 2020  No prior colonoscopies  [facility] patient is a full code

## 2023-10-11 NOTE — ED TRIAGE NOTES
Presents to the ED via EMS from [facility] for AMS and HTN that started about 2 hours ago and for hematuria that started yesterday.

## 2023-10-11 NOTE — ASSESSMENT & PLAN NOTE
Witnessed seizure in the emergency department  Family is not aware of any recent seizures before   Seizures 1st started more than 15 years ago even prior to strokes  Will obtain EEG  Continue with patient's Keppra  I have discussed with family that we do not have neurologist and if seizures become uncontrolled patient may need to be transferred  Family were okay with patient being admitted here

## 2023-10-12 PROBLEM — G93.41 ENCEPHALOPATHY, METABOLIC: Status: ACTIVE | Noted: 2023-10-12

## 2023-10-12 PROBLEM — R78.81 BACTEREMIA DUE TO ESCHERICHIA COLI: Status: ACTIVE | Noted: 2023-10-12

## 2023-10-12 PROBLEM — R41.82 ALTERED MENTAL STATUS: Status: ACTIVE | Noted: 2023-10-12

## 2023-10-12 PROBLEM — B96.20 BACTEREMIA DUE TO ESCHERICHIA COLI: Status: ACTIVE | Noted: 2023-10-12

## 2023-10-12 LAB
25(OH)D3 SERPL-MCNC: 58 NG/ML
ALBUMIN SERPL BCP-MCNC: 2.6 G/DL (ref 3.5–5)
ALP SERPL-CCNC: 65 U/L (ref 45–115)
ALT SERPL W P-5'-P-CCNC: 34 U/L (ref 16–61)
AST SERPL W P-5'-P-CCNC: 32 U/L (ref 15–37)
BASOPHILS # BLD AUTO: 0.05 K/UL (ref 0–0.2)
BASOPHILS NFR BLD AUTO: 0.3 % (ref 0–1)
BILIRUB DIRECT SERPL-MCNC: 0.1 MG/DL (ref 0–0.2)
BILIRUB SERPL-MCNC: 0.5 MG/DL (ref ?–1.2)
DIFFERENTIAL METHOD BLD: ABNORMAL
EOSINOPHIL # BLD AUTO: 0.03 K/UL (ref 0–0.5)
EOSINOPHIL NFR BLD AUTO: 0.2 % (ref 1–4)
ERYTHROCYTE [DISTWIDTH] IN BLOOD BY AUTOMATED COUNT: 15.2 % (ref 11.5–14.5)
EST. AVERAGE GLUCOSE BLD GHB EST-MCNC: 114 MG/DL
FOLATE SERPL-MCNC: 9.8 NG/ML (ref 3.1–17.5)
GLUCOSE SERPL-MCNC: 121 MG/DL (ref 70–105)
GLUCOSE SERPL-MCNC: 146 MG/DL (ref 70–105)
GLUCOSE SERPL-MCNC: 85 MG/DL (ref 70–105)
GLUCOSE SERPL-MCNC: 96 MG/DL (ref 70–105)
HBA1C MFR BLD HPLC: 6 % (ref 4.5–6.6)
HCT VFR BLD AUTO: 39.1 % (ref 40–54)
HGB BLD-MCNC: 12.6 G/DL (ref 13.5–18)
IMM GRANULOCYTES # BLD AUTO: 0.09 K/UL (ref 0–0.04)
IMM GRANULOCYTES NFR BLD: 0.6 % (ref 0–0.4)
LYMPHOCYTES # BLD AUTO: 2.42 K/UL (ref 1–4.8)
LYMPHOCYTES NFR BLD AUTO: 15.4 % (ref 27–41)
MCH RBC QN AUTO: 27.8 PG (ref 27–31)
MCHC RBC AUTO-ENTMCNC: 32.2 G/DL (ref 32–36)
MCV RBC AUTO: 86.1 FL (ref 80–96)
MONOCYTES # BLD AUTO: 1.54 K/UL (ref 0–0.8)
MONOCYTES NFR BLD AUTO: 9.8 % (ref 2–6)
MPC BLD CALC-MCNC: 12.1 FL (ref 9.4–12.4)
NEUTROPHILS # BLD AUTO: 11.6 K/UL (ref 1.8–7.7)
NEUTROPHILS NFR BLD AUTO: 73.7 % (ref 53–65)
NRBC # BLD AUTO: 0 X10E3/UL
NRBC, AUTO (.00): 0 %
PLATELET # BLD AUTO: 75 K/UL (ref 150–400)
PROT SERPL-MCNC: 6.2 G/DL (ref 6.4–8.2)
RBC # BLD AUTO: 4.54 M/UL (ref 4.6–6.2)
T4 SERPL-MCNC: 6.3 ΜG/DL (ref 4.5–12.1)
TSH SERPL DL<=0.005 MIU/L-ACNC: 1.47 UIU/ML (ref 0.36–3.74)
URATE SERPL-MCNC: 4.4 MG/DL (ref 3.5–7.2)
VERIGENE RESULT 2: ABNORMAL
VERIGENE RESULT: ABNORMAL
VIT B12 SERPL-MCNC: 258 PG/ML (ref 193–986)
WBC # BLD AUTO: 15.73 K/UL (ref 4.5–11)

## 2023-10-12 PROCEDURE — 95812 EEG 41-60 MINUTES: CPT

## 2023-10-12 PROCEDURE — 99233 SBSQ HOSP IP/OBS HIGH 50: CPT | Mod: ,,, | Performed by: HOSPITALIST

## 2023-10-12 PROCEDURE — 27000221 HC OXYGEN, UP TO 24 HOURS

## 2023-10-12 PROCEDURE — 63600175 PHARM REV CODE 636 W HCPCS: Performed by: HOSPITALIST

## 2023-10-12 PROCEDURE — 80076 HEPATIC FUNCTION PANEL: CPT | Performed by: HOSPITALIST

## 2023-10-12 PROCEDURE — 84443 ASSAY THYROID STIM HORMONE: CPT | Performed by: HOSPITALIST

## 2023-10-12 PROCEDURE — 84436 ASSAY OF TOTAL THYROXINE: CPT | Performed by: HOSPITALIST

## 2023-10-12 PROCEDURE — 85025 COMPLETE CBC W/AUTO DIFF WBC: CPT | Performed by: HOSPITALIST

## 2023-10-12 PROCEDURE — 99233 PR SUBSEQUENT HOSPITAL CARE,LEVL III: ICD-10-PCS | Mod: ,,, | Performed by: HOSPITALIST

## 2023-10-12 PROCEDURE — 83036 HEMOGLOBIN GLYCOSYLATED A1C: CPT | Performed by: HOSPITALIST

## 2023-10-12 PROCEDURE — 25000003 PHARM REV CODE 250: Performed by: HOSPITALIST

## 2023-10-12 PROCEDURE — 82746 ASSAY OF FOLIC ACID SERUM: CPT | Performed by: HOSPITALIST

## 2023-10-12 PROCEDURE — 84550 ASSAY OF BLOOD/URIC ACID: CPT | Performed by: HOSPITALIST

## 2023-10-12 PROCEDURE — 82962 GLUCOSE BLOOD TEST: CPT

## 2023-10-12 PROCEDURE — 94761 N-INVAS EAR/PLS OXIMETRY MLT: CPT

## 2023-10-12 PROCEDURE — 11000001 HC ACUTE MED/SURG PRIVATE ROOM

## 2023-10-12 PROCEDURE — 82306 VITAMIN D 25 HYDROXY: CPT | Performed by: HOSPITALIST

## 2023-10-12 RX ADMIN — LEVETIRACETAM 1000 MG: 500 TABLET, FILM COATED ORAL at 10:10

## 2023-10-12 RX ADMIN — LEVETIRACETAM 1000 MG: 500 TABLET, FILM COATED ORAL at 09:10

## 2023-10-12 RX ADMIN — MEROPENEM 1 G: 1 INJECTION, POWDER, FOR SOLUTION INTRAVENOUS at 06:10

## 2023-10-12 RX ADMIN — MEROPENEM 1 G: 1 INJECTION, POWDER, FOR SOLUTION INTRAVENOUS at 10:10

## 2023-10-12 RX ADMIN — MIRTAZAPINE 7.5 MG: 7.5 TABLET, FILM COATED ORAL at 09:10

## 2023-10-12 RX ADMIN — POTASSIUM CHLORIDE AND SODIUM CHLORIDE: 450; 150 INJECTION, SOLUTION INTRAVENOUS at 08:10

## 2023-10-12 RX ADMIN — PIPERACILLIN AND TAZOBACTAM 4.5 G: 4; .5 INJECTION, POWDER, LYOPHILIZED, FOR SOLUTION INTRAVENOUS; PARENTERAL at 03:10

## 2023-10-12 NOTE — PLAN OF CARE
[facility]  Initial Discharge Assessment       Primary Care Provider: Giovanni Wu IV, MD    Admission Diagnosis: Syncope [R55]  Altered mental status [R41.82]  Chest pain [R07.9]  Fever, unspecified fever cause [R50.9]  Altered mental status, unspecified altered mental status type [R41.82]    Admission Date: 10/11/2023  Expected Discharge Date:     Transition of Care Barriers: None    Payor: [employer] MANAGED MEDICARE / Plan: [employer] ADVANTAGE / Product Type: Medicare Advantage /     Extended Emergency Contact Information  Primary Emergency Contact: BRIDGET MARCOS  Mobile Phone: [phone]  Relation: Other  Preferred language: English   needed? No  Secondary Emergency Contact: DORI MARCOS  Mobile Phone: [phone]  Relation: Daughter  Preferred language: English   needed? No    Discharge Plan A: Return to nursing home  Discharge Plan B: Skilled Nursing Facility    No Pharmacies Listed    Initial Assessment (most recent)       Adult Discharge Assessment - 10/12/23 0910          Discharge Assessment    Assessment Type Discharge Planning Assessment     Confirmed/corrected address, phone number and insurance Yes     Source of Information family;legal guardian     Communicated ALEX with patient/caregiver Date not available/Unable to determine     People in Home facility resident     Facility Arrived From: [facility]     Do you expect to return to your current living situation? Yes     Do you have help at home or someone to help you manage your care at home? Yes     Who are your caregiver(s) and their phone number(s)? nh     Prior to hospitilization cognitive status: Unable to Assess     Equipment Currently Used at Home wheelchair     Patient currently being followed by outpatient case management? No     Do you currently have service(s) that help you manage your care at home? No     Do you take prescription medications? Yes     Do you have prescription  coverage? Yes     Coverage [facility] medicare     Do you have any problems affording any of your prescribed medications? No     Is the patient taking medications as prescribed? yes     Who is going to help you get home at discharge? nh     Are you on dialysis? No     DME Needed Upon Discharge  none     Discharge Plan discussed with: POA     Name(s) and Number(s) ugo powers     Transition of Care Barriers None     Discharge Plan A Return to nursing home     Discharge Plan B Skilled Nursing Facility        Physical Activity    On average, how many days per week do you engage in moderate to strenuous exercise (like a brisk walk)? 0 days     On average, how many minutes do you engage in exercise at this level? 0 min        Alcohol Use    Q1: How often do you have a drink containing alcohol? Never     Q2: How many drinks containing alcohol do you have on a typical day when you are drinking? Patient does not drink     Q3: How often do you have six or more drinks on one occasion? Never                      Ss spoke with pt's guardian, ugo and pt is a resident at [facility] and plans to return at d/c. Initial info faxed to [facility]. Packet placed in [place]. Ss following.

## 2023-10-12 NOTE — CONSULTS
History of Present Illness  History of Present Illness  Mr. Calero is 59 years old. He was sent to me by Dr. Vega Milian because of an elevated PSA of 5.9 on June 19, 2014. He originally had an appointment for consultation on September 25 but apparently did not keep that appointment. He has apparently not had another PSA since that time. He has had some slowing of his urinary stream but generally voids reasonably well.. Patient says he has high blood pressure but takes no medication for this and patient is not aware of any previous PSA values nor other  problems such as stones, UTIs, hematuria, etc.  [February 4, 2015]    Patient still had an elevated PSA on repeat in February. Mr. Calero missed an appointment last month. Returns today for transrectal ultrasound of prostate with biopsies. No known family history of prostate cancer.  [April 2, 2015]    Patient has carcinoma of prostate. He has an appointment to see Dr. Jackson next week about possibly proceeding with robotic prostatectomy. He came in today because he was worried after passing blood in his ejaculate on 2 occasions since the biopsies. He has had no fever or anything  suggesting major problems other than the hemospermia.  [April 24, 2015]    Mr. Calero was sent to Dr. Jackson after he was diagnosed with prostate cancer. He had a robotic prostatectomy done June 24, 2015. He did reasonably well from surgery but since that time patient has had a stroke. He had CVA in January and has not been the same since. He is getting physical therapy 2 days weekly. He had his PSA today and it is immeasurable so clinically doing okay in that regard. No recent urinary tract infection problems; he is in for follow-up of prostate cancer. Patient's only complaint is that he can't have sex. [August 4, 2016]    Mr. Calero is in for the first time in one year as he missed his six-month appointment. Patient in with his nephew who is also his caregiver. Patient basically  stable. Main problem seems to be related to the previous stroke. Incontinence does not seem to be any worse. [September 19, 2017]    Mr. Calero was seen in clinic today for the first time in nearly 2 years. He is having to stay in a nursing home now. Patient was seen when he was in [facility] in June. He was found to have a bladder diverticulum and Gonzalez catheter was left indwelling as we did not feel it would be logical for him to have surgery for a bladder diverticulum. We thought he probably had a lymphocele from the CT scan; it turned out the problem was actually a bladder diverticulum. Gross hematuria felt to be related to that because he cannot empty. I brought him back for us to change the catheter the first time as I suspected they would have trouble changing it at a nursing home since he is unable to get square in the bed because of his contractures. As it turned out that the nurses at the nursing home had already changed his catheter several days ago when catheter got stopped up so this is not likely to be a major issue. Patient in with his wife today but was brought in by non-emergency transport. [July 30, 2019]      PSA Results:  Past PSA Results     PSA was <0.010 on September 19, 2017  and 8/4/2016  PSA was 5.870 on February 4, 2015  PSA was 5.9 on June 19, 2014      The above notes are from the old electronic medical record system.  Patient did have original biopsy by me but had his robotic prostatectomy done by Dr. Brett Jackson in 2015.  The above is the pathology report of Dr. Chambers.  ---------------------------------------------------------------------------------------------------------------------------------------------------------------------------------------------------    The above notes are from the old electronic medical record.  Patient has not seen me since 2019.  Subsequently had a suprapubic tube inserted in [facility] at [facility].  He is seeing another  urologist there but I am not sure who that is and his wife is not sure.    Patient has been bleeding from his urethra.  He does not have a prostate so it is not from BPH.  Probably needs cystoscopy to see where he is bleeding from but plan is for him to go back [place].  He has pressure necrosis of the penis, as apparently that was caused when he was wearing an indwelling urethral Gonzalez.  Now he has a 16 [place] suprapubic tube.  Probably does need cystoscopy because of the hematuria as apparently it was very impressive yesterday but very minimal bleeding today.  Bladder stone could be the source of the problem.  I will be happy to do his cysto here if desired or if they wish to go back to [place] to continue follow-up there that will also be fine.  I will discuss further with his wife but I did not have this information when I saw him earlier today.  The bleeding has subsided basically compared to yesterday.

## 2023-10-13 PROBLEM — G93.41 ENCEPHALOPATHY, METABOLIC: Status: RESOLVED | Noted: 2023-10-12 | Resolved: 2023-10-13

## 2023-10-13 PROBLEM — R31.9 HEMATURIA: Status: RESOLVED | Noted: 2023-01-18 | Resolved: 2023-10-13

## 2023-10-13 PROBLEM — R41.82 ALTERED MENTAL STATUS: Status: RESOLVED | Noted: 2023-10-12 | Resolved: 2023-10-13

## 2023-10-13 LAB
GLUCOSE SERPL-MCNC: 107 MG/DL (ref 70–105)
GLUCOSE SERPL-MCNC: 128 MG/DL (ref 70–105)
GLUCOSE SERPL-MCNC: 96 MG/DL (ref 70–105)
UA COMPLETE W REFLEX CULTURE PNL UR: ABNORMAL

## 2023-10-13 PROCEDURE — 99239 HOSP IP/OBS DSCHRG MGMT >30: CPT | Mod: ,,, | Performed by: HOSPITALIST

## 2023-10-13 PROCEDURE — 25000003 PHARM REV CODE 250: Performed by: HOSPITALIST

## 2023-10-13 PROCEDURE — 82962 GLUCOSE BLOOD TEST: CPT

## 2023-10-13 PROCEDURE — 11000001 HC ACUTE MED/SURG PRIVATE ROOM

## 2023-10-13 PROCEDURE — 99900035 HC TECH TIME PER 15 MIN (STAT)

## 2023-10-13 PROCEDURE — 27000221 HC OXYGEN, UP TO 24 HOURS

## 2023-10-13 PROCEDURE — 87040 BLOOD CULTURE FOR BACTERIA: CPT | Performed by: HOSPITALIST

## 2023-10-13 PROCEDURE — 94761 N-INVAS EAR/PLS OXIMETRY MLT: CPT

## 2023-10-13 PROCEDURE — 99239 PR HOSPITAL DISCHARGE DAY,>30 MIN: ICD-10-PCS | Mod: ,,, | Performed by: HOSPITALIST

## 2023-10-13 PROCEDURE — 63600175 PHARM REV CODE 636 W HCPCS: Performed by: HOSPITALIST

## 2023-10-13 RX ADMIN — MIRTAZAPINE 7.5 MG: 7.5 TABLET, FILM COATED ORAL at 08:10

## 2023-10-13 RX ADMIN — LEVETIRACETAM 1000 MG: 500 TABLET, FILM COATED ORAL at 09:10

## 2023-10-13 RX ADMIN — MEROPENEM 1 G: 1 INJECTION, POWDER, FOR SOLUTION INTRAVENOUS at 01:10

## 2023-10-13 RX ADMIN — MEROPENEM 1 G: 1 INJECTION, POWDER, FOR SOLUTION INTRAVENOUS at 10:10

## 2023-10-13 RX ADMIN — POTASSIUM CHLORIDE AND SODIUM CHLORIDE: 450; 150 INJECTION, SOLUTION INTRAVENOUS at 07:10

## 2023-10-13 RX ADMIN — LEVETIRACETAM 1000 MG: 500 TABLET, FILM COATED ORAL at 08:10

## 2023-10-13 RX ADMIN — ERTAPENEM 1 G: 1 INJECTION INTRAMUSCULAR; INTRAVENOUS at 06:10

## 2023-10-13 NOTE — NURSING
At 1600 I recd a secure chat from Dr. Montes De Oca saying the pt can go back to nursing home today and a order is in to [facility], at 1602 I called  and there was no answer, I tried again at 1605 and still no answer, thereafter at 1610 I called [facility] at [phone] and spoke with a nurse at the station he resides in and they are not expecting the pt to come back today.

## 2023-10-13 NOTE — PLAN OF CARE
[facility]  Discharge Final Note    Primary Care Provider: Giovanni Wu IV, MD    Expected Discharge Date:     Final Discharge Note (most recent)       Final Note - 10/13/23 1326          Final Note    Assessment Type Final Discharge Note     Anticipated Discharge Disposition Skilled Nursing Facility        Post-Acute Status    Discharge Delays None known at this time                     Important Message from Medicare  Important Message from Medicare regarding Discharge Appeal Rights: Given to patient/caregiver, Explained to patient/caregiver, Signed/date by patient/caregiver (verbal- ugo powers)     Date IMM was signed: 10/12/23  Time IMM was signed: 0910    After-discharge care                Destination       [facility]   Service: Skilled Nursing    [facility] [address] [address]   Phone: [phone]                   Pt to return to [facility] re[facility] today, confirmed with ashwini at [place] that pt can return on IV invanz

## 2023-10-13 NOTE — PROCEDURES
[facility] [address] [place] [address]  Neurophysiology Department  Tel. [phone]    Patient: James Calero  MRN: [id number]   YOB: 1955   Date of Service:  10/12/2023        Refererring Physician:  Dr. Bipin Montes De Oca     EEG NUMBER:  [id number]    This is a standard 44 minute digital EEG performed as an inpatient    INDICATION:  68-year-old male with altered mental status.      REPORT:  Background activity reaches 5-6 hertz range.  Intermittent artifacts noted due to restlessness.  Photic stimulation showed symmetrical driving.  Hyperventilation was performed and it did not provoke any focal slowing or seizure activity.  No clinical events occurred during the recording.  Heart rate 90 per minute.  No electrographic seizure activity noted.      IMPRESSION:   EEG  showed moderate cerebral dysfunction in both hemisphere and this could be due to encephalopathy ,medication effect or postictal state.  This is a nonspecific finding and clinical correlation is suggested.  No electrographic seizure activity noted on this EEG.

## 2023-10-13 NOTE — SUBJECTIVE & OBJECTIVE
Past Medical History:   Diagnosis Date    Diabetes mellitus     GERD (gastroesophageal reflux disease)     History of UTI 01/18/2023    No urine cultures available    Hypercholesterolemia     Hypertension     Malignant neoplasm of prostate     Seizures     Stroke        Past Surgical History:   Procedure Laterality Date    INSERTION OF SUPRAPUBIC CATHETER         Review of patient's allergies indicates:   Allergen Reactions    Sulfadiazine (bulk)        No current facility-administered medications on file prior to encounter.     Current Outpatient Medications on File Prior to Encounter   Medication Sig    ciprofloxacin HCl (CIPRO) 250 MG tablet Take 250 mg by mouth 2 (two) times daily.    ergocalciferol (VITAMIN D2) 50,000 unit Cap Take 50,000 Units by mouth every 7 days.    levETIRAcetam (KEPPRA) 1000 MG tablet Take 1,000 mg by mouth 2 (two) times daily.    methenamine (HIPREX) 1 gram Tab Take 1 g by mouth 2 (two) times daily.    methocarbamoL (ROBAXIN) 750 MG Tab Take 750 mg by mouth 3 (three) times daily.    mirtazapine (REMERON) 7.5 MG Tab Take 7.5 mg by mouth every evening.    oxyCODONE (OXY-IR) 5 mg Cap Take 5 mg by mouth every 4 (four) hours as needed for Pain.    rosuvastatin (CRESTOR) 40 MG Tab Take 40 mg by mouth once daily.    senna-docusate 8.6-50 mg (SENNA WITH DOCUSATE SODIUM) 8.6-50 mg per tablet Take 1 tablet by mouth once daily.     Family History    None       Tobacco Use    Smoking status: Never    Smokeless tobacco: Never   Substance and Sexual Activity    Alcohol use: Never    Drug use: Never    Sexual activity: Not on file     Review of Systems   Constitutional:  Negative for appetite change, fatigue and fever.   HENT:  Negative for congestion, hearing loss and trouble swallowing.    Respiratory:  Negative for chest tightness, shortness of breath and wheezing.    Cardiovascular:  Negative for chest pain and palpitations.   Gastrointestinal:  Negative for abdominal pain, constipation and nausea.    Genitourinary:  Positive for hematuria. Negative for difficulty urinating and dysuria.   Musculoskeletal:  Positive for gait problem. Negative for back pain and neck stiffness.   Skin:  Negative for pallor and rash.   Neurological:  Negative for dizziness, speech difficulty and headaches.   Psychiatric/Behavioral:  Negative for confusion and suicidal ideas.         Lethargy  Patient able to communicate with answering questions and follows simple commands  One-word answers     Objective:     Vital Signs (Most Recent):  Temp: 98.1 °F (36.7 °C) (10/12/23 1816)  Pulse: 95 (10/12/23 1816)  Resp: 18 (10/12/23 1816)  BP: 105/70 (10/12/23 1816)  SpO2: 95 % (10/12/23 1816) Vital Signs (24h Range):  Temp:  [98.1 °F (36.7 °C)-98.8 °F (37.1 °C)] 98.1 °F (36.7 °C)  Pulse:  [] 95  Resp:  [17-21] 18  SpO2:  [93 %-99 %] 95 %  BP: ()/(54-72) 105/70     Weight: 91.8 kg (202 lb 6.1 oz)  Body mass index is 28.23 kg/m².     Physical Exam  Vitals reviewed.   Constitutional:       General: He is sleeping. He is not in acute distress.     Appearance: He is well-developed. He is not toxic-appearing.   HENT:      Head: Normocephalic.      Nose: Nose normal.      Mouth/Throat:      Pharynx: Oropharynx is clear.   Eyes:      Extraocular Movements: Extraocular movements intact.      Pupils: Pupils are equal, round, and reactive to light.   Neck:      Thyroid: No thyroid mass.      Vascular: No carotid bruit.   Cardiovascular:      Rate and Rhythm: Normal rate and regular rhythm.      Pulses: Normal pulses.      Heart sounds: Normal heart sounds. No murmur heard.  Pulmonary:      Effort: Pulmonary effort is normal.      Breath sounds: Normal breath sounds and air entry. No wheezing.   Abdominal:      General: Bowel sounds are normal. There is no distension.      Palpations: Abdomen is soft.      Tenderness: There is no abdominal tenderness.      Comments: Suprapubic catheter in lower abdomen   Genitourinary:     Comments: Blood  noted in groin area from previous bleeding but no active bleeding at time seen  Musculoskeletal:         General: Normal range of motion.      Cervical back: Neck supple. No rigidity.   Skin:     General: Skin is warm.      Coloration: Skin is not jaundiced.      Findings: No lesion.   Neurological:      General: No focal deficit present.      Mental Status: He is lethargic.      Cranial Nerves: No cranial nerve deficit.      Gait: Gait abnormal.      Comments: Fairly dense paralysis on left side    Will awake and answer questions  Answers 1 word at a time  Is in seems a little sleepy   Psychiatric:         Attention and Perception: Attention normal.         Mood and Affect: Mood normal.         Behavior: Behavior normal. Behavior is cooperative.         Thought Content: Thought content normal.         Cognition and Memory: Cognition normal.              CRANIAL NERVES     CN III, IV, VI   Pupils are equal, round, and reactive to light.       Significant Labs: All pertinent labs within the past 24 hours have been reviewed.  BMP:   Recent Labs   Lab 10/11/23  1135   *      K 3.2*      CO2 25   BUN 22*   CREATININE 1.47*   CALCIUM 8.7   MG 1.8       CBC:   Recent Labs   Lab 10/11/23  1135 10/12/23  0239   WBC 22.31* 15.73*   HGB 15.5 12.6*   HCT 47.8 39.1*   * 75*       CMP:   Recent Labs   Lab 10/11/23  1135 10/12/23  0239     --    K 3.2*  --      --    CO2 25  --    *  --    BUN 22*  --    CREATININE 1.47*  --    CALCIUM 8.7  --    PROT  --  6.2*   ALBUMIN  --  2.6*   BILITOT  --  0.5   ALKPHOS  --  65   AST  --  32   ALT  --  34   ANIONGAP 13  --          Significant Imaging: I have reviewed all pertinent imaging results/findings within the past 24 hours.    Imaging Results              X-Ray Abdomen AP 1 View (Final result)  Result time 10/11/23 19:02:51      Final result by Ulysses Roberts II, MD (10/11/23 19:02:51)                   Impression:      Increased  stool volume in the colon, may indicate constipation.      Electronically signed by: Ulysses Roberts  Date:    10/11/2023  Time:    19:02               Narrative:    EXAMINATION:  XR ABDOMEN AP 1 VIEW    CLINICAL HISTORY:  Constipation;    COMPARISON:  30 October 2019    FINDINGS:  No free fluid or free air seen.  Increased stool volume is seen in the colon.  The bowel gas pattern otherwise appears within normal limits.  No abnormal calcifications are present.  No other abnormality is identified.                                       CT Head Without Contrast (Final result)  Result time 10/11/23 13:17:43      Final result by Ulysses Roberts II, MD (10/11/23 13:17:43)                   Impression:      No evidence of abnormality demonstrated.      Electronically signed by: Ulysses Roberts  Date:    10/11/2023  Time:    13:17               Narrative:    EXAMINATION:  CT HEAD WITHOUT CONTRAST    CLINICAL HISTORY:  Altered mental status, nontraumatic (Ped 0-18y);    TECHNIQUE:  Axial CT imaging of the brain is performed without contrast with 3 mm increments.    CT dose reduction technique used - Dose Rite and tube current modulation.    COMPARISON:  24 March 2019    FINDINGS:  No evidence of hemorrhage, mass, mass effect, midline shift or acute infarct seen.  Chronic changes in volume loss more prominent in the right cerebral hemisphere similar to previous CT otherwise the brain parenchyma attenuation and differentiation appears stable.  The ventricles and cisterns are unchanged in caliber.  Craniotomy changes and aneurysm clip on the right appears stable.  Cranial or skull base abnormality is identified.                                       X-Ray Chest 1 View (Final result)  Result time 10/11/23 11:22:28      Final result by Ulysses Roberts II, MD (10/11/23 11:22:28)                   Impression:      Findings suggest mild cardiac decompensation and / or pneumonitis.      Electronically signed by: Ulysses  Armando  Date:    10/11/2023  Time:    11:22               Narrative:    EXAMINATION:  XR CHEST 1 VIEW    CLINICAL HISTORY:  Sepsis;    COMPARISON:  17 June 2019    TECHNIQUE:  XR CHEST 1 VIEW    FINDINGS:  The heart and mediastinum are stable in size and configuration.  The pulmonary vascularity is slightly increased with bilateral increased interstitial lung density.  No other lung infiltrates, effusions, pneumothorax or other abnormality is demonstrated.                                    Intake/Output - Last 3 Shifts         10/11 0700  10/12 0659 10/12 0700  10/13 0659    I.V. (mL/kg)  197 (2.1)    IV Piggyback 1000 100    Total Intake(mL/kg) 1000 (10.9) 297 (3.2)    Urine (mL/kg/hr)  700 (0.5)    Stool  0    Total Output  700    Net +1000 -403          Stool Occurrence  4 x          Microbiology Results (last 7 days)       Procedure Component Value Units Date/Time    Urine culture [[id number]]  (Abnormal) Collected: 10/11/23 1628    Order Status: Completed Specimen: Urine Updated: 10/12/23 0908     Culture, Urine 25,000 Gram-negative Bacilli     Comment: Identification and Susceptibility to Follow       Verigene Gram-negative Blood Culture Test [[id number]]  (Abnormal) Collected: 10/11/23 1135    Order Status: Completed Specimen: Blood Updated: 10/12/23 0718     Verigene Result E. coli     Verigene Result ESBL Positive    Blood culture x two cultures. Draw prior to antibiotics [[id number]]  (Abnormal) Collected: 10/11/23 1146    Order Status: Completed Specimen: Blood Updated: 10/12/23 0531     Gram Stain Result Gram negative bacilli     Comment: From Aerobic Bottle         Gram negative bacilli     Comment: From Anaerobe bottle       Blood culture x two cultures. Draw prior to antibiotics [[id number]]  (Abnormal) Collected: 10/11/23 1135    Order Status: Completed Specimen: Blood Updated: 10/11/23 2325     Gram Stain Result Gram negative bacilli     Comment: From Anaerobe bottle         Gram negative bacilli      Comment: From Aerobic Bottle       Rapid Influenza A/B [[id number]]  (Normal) Collected: 10/11/23 1214    Order Status: Completed Specimen: Nasopharyngeal Updated: 10/11/23 1402     Influenza A Negative     Influenza B Negative

## 2023-10-13 NOTE — HOSPITAL COURSE
10/12 Feels better. No more fever. Alert and responsive. Family in room. ESBL e Coli in BC. Adjusted ATB. Needs 19 days IV ATB.    10/13 patient feels much better without fever.  No new issues.  Blood cultures repeated.  Blood cultures with ESBL E coli.  Reviewed C&S from ESBL E coli in urine.  Nursing home can take patient back to finish InVanz to complete 10 days.  Patient to follow up with Dr. Malloy in 1 month with changing out suprapubic tube and for outpatient cystoscopy.  Dr. Malloy office is to be contacted at [phone] for that purpose.  Family in room.  Will discharge back to nursing home.

## 2023-10-13 NOTE — DISCHARGE SUMMARY
[facility] Medicine  Discharge Summary      Patient Name: James Calero  MRN: [id number]  RUMA: [id number]  Patient Class: IP- Inpatient  Admission Date: 10/11/2023  Hospital Length of Stay: 2 days  Discharge Date and Time:  10/13/2023 3:11 PM  Attending Physician: Bipin Montes De Oca MD   Discharging Provider: Bipin Montes De Oca MD  Primary Care Provider: Giovanni Wu IV, MD    Primary Care Team: Networked reference to record PCT     HPI:   Chief complaint:  Altered mental status and bleeding from penis    History of present illness:      Mr. Calero referred to emergency room from [facility] with history of penile bleeding.  Had had some recent altered mental status.  Family states patient is normally able to converse well and help with self-care.  Been less talkative and more sleepy recently.  Had bleeding from penis.  Patient has history of prostate surgery for prostate cancer 20 years earlier done at this facility.  No radiation therapy apparently required.  Patient has had treatment for urinary tract infections several times recently.  Has long history of Gonzalez catheter secondary to retention.  Had split in tip of penis from longstanding Gonzalez catheter.  Followed at [facility].  Family states patient had suprapubic catheter conversion 4 months ago.  When seen in the emergency room was noted to have elevation of fever.  Bleeding from penis area which was addressed by Urology and since stopped.  Had apparent witnessed seizure in the ER.  Hospitalist service asked to admit.     Review of systems:  See above.  Family states patient is generally able to converse, recognize family members and watch TV.  He is able to feed himself.  Requires help otherwise with activities of daily living.  Had CVA with significant left-sided weakness 7 years earlier and has not been ambulatory since.  Snores but no history of previous diagnosis of obstructive sleep apnea.  Review of  systems otherwise.    Past medical history:  -cerebrovascular accident with left-sided hemiparesis 7 years earlier (patient is right-handed)  -prostate cancer treated 20 years earlier with surgery  -neurogenic bladder with longstanding Gonzalez catheter recently converted into suprapubic catheter  -recurrent / chronic UTIs  -seizure disorder, seizure manifest by staring into space and occasionally brief loss of consciousness with convulsions.  No tongue biting    Past surgical history:  Surgery for prostate cancer    Allergy to sulfa    Social history:  Nursing home placement for last 7 years  Common law wife helps with patient's decision  Patient has children    Family history:  No one with premature coronary artery disease  Sister on dialysis  Brother on dialysis and also has diabetes    Health maintenance issues:  Family states patient gets all required vaccines at the nursing home and generally gets a flu shot every year and has had all the COVID vaccinations recommended by the nursing home  Patient had COVID-19 in 2020  No prior colonoscopies  Family states patient is a full code            * No surgery found *      Hospital Course:   10/12 Feels better. No more fever. Alert and responsive. Family in room. ESBL e Coli in BC. Adjusted ATB. Needs 19 days IV ATB.    10/13 patient feels much better without fever.  No new issues.  Blood cultures repeated.  Blood cultures with ESBL E coli.  Reviewed C&S from ESBL E coli in urine.  Nursing home can take patient back to finish InVanz to complete 10 days.  Patient to follow up with Dr. Malloy in 1 month with changing out suprapubic tube and for outpatient cystoscopy.  Dr. Malloy office is to be contacted at [phone] for that purpose.  Family in room.  Will discharge back to nursing home.           Goals of Care Treatment Preferences:  Code Status: Full Code      Consults:   Consults (From admission, onward)        Status Ordering Provider     Case Management/Social  Services  Once        Provider:  (Not yet assigned)    Completed SIOBHAN CALLEJAS     Inpatient consult to Urology  Once        Provider:  Meliton Malloy Jr., MD    Completed SIOBHAN CALLEJAS          Neuro  Seizure disorder    Witnessed seizure in the emergency department  Family is not aware of any recent seizures before   Seizures 1st started more than 15 years ago even prior to strokes  Will obtain EEG  Continue with patient's Keppra  I have discussed with family that we do not have neurologist and if seizures become uncontrolled patient may need to be transferred  Family were okay with patient being admitted here    History of cardioembolic cerebrovascular accident (CVA)    Left-sided weakness  Nonambulatory  (patient is right-handed)    ID  * Bacteremia due to Escherichia coli    10/12 ESBL E Coli on Merrem plan 10 days  Recheck BC tomorrow.     10/13 blood cultures repeated  Finish 10 day course of InVanz    Oncology  Personal history of prostate cancer    Treated more than 20 years ago by surgery at this facility      Final Active Diagnoses:    Diagnosis Date Noted POA    PRINCIPAL PROBLEM:  Bacteremia due to Escherichia coli [R78.81, B96.20] 10/12/2023 Yes    Seizure disorder [G40.909] 10/11/2023 Yes    Personal history of prostate cancer [Z85.46] 01/18/2023 Not Applicable    History of cardioembolic cerebrovascular accident (CVA) [Z86.73] 01/18/2023 Not Applicable      Problems Resolved During this Admission:    Diagnosis Date Noted Date Resolved POA    Altered mental status [R41.82] 10/12/2023 10/13/2023 Yes    Encephalopathy, metabolic [G93.41] 10/12/2023 10/13/2023 Yes    Hematuria [R31.9] 01/18/2023 10/13/2023 Yes       Discharged Condition: fair    Disposition: Skilled Nursing Facility    Follow Up:   Contact information for follow-up providers     Meliton Malloy Jr., MD. Schedule an appointment as soon as possible for a visit.    Specialty: Urology  Why: Call Dr. Malloy nurse at [phone] and  "set up outpatient cystoscopy  Contact information:  [address] [place] [address] [place] [address]  [phone]                   Contact information for after-discharge care     [facility]    Service: Skilled [facility]  Contact information:  [address] [address] [phone]                           Patient Instructions:      Ambulatory referral/consult to Urology   Standing Status: Future   Referral Priority: Routine Referral Type: Consultation   Referral Reason: Specialty Services Required   Requested Specialty: Urology   Number of Visits Requested: 1     Diet Adult Regular     Activity as tolerated       Significant Diagnostic Studies: Labs:   BMP: No results for input(s): "GLU", "NA", "K", "CL", "CO2", "BUN", "CREATININE", "CALCIUM", "MG" in the last 48 hours., CMP   Recent Labs   Lab 10/12/23  0239   PROT 6.2*   ALBUMIN 2.6*   BILITOT 0.5   ALKPHOS 65   AST 32   ALT 34    and CBC   Recent Labs   Lab 10/12/23  0239   WBC 15.73*   HGB 12.6*   HCT 39.1*   PLT 75*     Imaging Results          X-Ray Abdomen AP 1 View (Final result)  Result time 10/11/23 19:02:51    Final result by Ulysses Roberts II, MD (10/11/23 19:02:51)                 Impression:      Increased stool volume in the colon, may indicate constipation.      Electronically signed by: Ulysses Roberts  Date:    10/11/2023  Time:    19:02             Narrative:    EXAMINATION:  XR ABDOMEN AP 1 VIEW    CLINICAL HISTORY:  Constipation;    COMPARISON:  30 October 2019    FINDINGS:  No free fluid or free air seen.  Increased stool volume is seen in the colon.  The bowel gas pattern otherwise appears within normal limits.  No abnormal calcifications are present.  No other abnormality is identified.                               CT Head Without Contrast (Final result)  Result time 10/11/23 13:17:43    Final result by Ulysses Roberts II, MD (10/11/23 13:17:43)                 Impression:      No " evidence of abnormality demonstrated.      Electronically signed by: Ulysses Roberts  Date:    10/11/2023  Time:    13:17             Narrative:    EXAMINATION:  CT HEAD WITHOUT CONTRAST    CLINICAL HISTORY:  Altered mental status, nontraumatic (Ped 0-18y);    TECHNIQUE:  Axial CT imaging of the brain is performed without contrast with 3 mm increments.    CT dose reduction technique used - Dose Rite and tube current modulation.    COMPARISON:  24 March 2019    FINDINGS:  No evidence of hemorrhage, mass, mass effect, midline shift or acute infarct seen.  Chronic changes in volume loss more prominent in the right cerebral hemisphere similar to previous CT otherwise the brain parenchyma attenuation and differentiation appears stable.  The ventricles and cisterns are unchanged in caliber.  Craniotomy changes and aneurysm clip on the right appears stable.  Cranial or skull base abnormality is identified.                               X-Ray Chest 1 View (Final result)  Result time 10/11/23 11:22:28    Final result by Ulysses Roberts II, MD (10/11/23 11:22:28)                 Impression:      Findings suggest mild cardiac decompensation and / or pneumonitis.      Electronically signed by: Ulysses Roberts  Date:    10/11/2023  Time:    11:22             Narrative:    EXAMINATION:  XR CHEST 1 VIEW    CLINICAL HISTORY:  Sepsis;    COMPARISON:  17 June 2019    TECHNIQUE:  XR CHEST 1 VIEW    FINDINGS:  The heart and mediastinum are stable in size and configuration.  The pulmonary vascularity is slightly increased with bilateral increased interstitial lung density.  No other lung infiltrates, effusions, pneumothorax or other abnormality is demonstrated.                              Intake/Output - Last 3 Shifts       10/11 0700  10/12 0659 10/12 0700  10/13 0659 10/13 0700  10/14 0659    I.V. (mL/kg)  197 (2.1)     IV Piggyback 1000 100     Total Intake(mL/kg) 1000 (10.9) 297 (3.2)     Urine (mL/kg/hr)  1200 (0.5)     Stool   0     Total Output  1200     Net +1000 -903            Stool Occurrence  4 x 1 x        Microbiology Results (last 7 days)     Procedure Component Value Units Date/Time    Blood culture [[id number]] Collected: 10/13/23 1432    Order Status: Sent Specimen: Blood Updated: 10/13/23 1438    Blood culture [[id number]]     Order Status: Sent Specimen: Blood     Blood culture x two cultures. Draw prior to antibiotics [[id number]]  (Abnormal) Collected: 10/11/23 1146    Order Status: Completed Specimen: Blood Updated: 10/13/23 1011     Culture, Blood Gram-negative Bacilli     Comment: Identification and Susceptibility to Follow        Gram Stain Result Gram negative bacilli     Comment: From Aerobic Bottle         Gram negative bacilli     Comment: From Anaerobe bottle       Blood culture x two cultures. Draw prior to antibiotics [[id number]]  (Abnormal) Collected: 10/11/23 1135    Order Status: Completed Specimen: Blood Updated: 10/13/23 0923     Culture, Blood Gram-negative Bacilli     Comment: Identification and Susceptibility to Follow        Gram Stain Result Gram negative bacilli     Comment: From Anaerobe bottle         Gram negative bacilli     Comment: From Aerobic Bottle       Urine culture [[id number]]  (Abnormal)  (Susceptibility) Collected: 10/11/23 1628    Order Status: Completed Specimen: Urine Updated: 10/13/23 0836     Culture, Urine 25,000 Escherichia coli ESBL    Verigene Gram-negative Blood Culture Test [[id number]]  (Abnormal) Collected: 10/11/23 1135    Order Status: Completed Specimen: Blood Updated: 10/12/23 0718     Verigene Result E. coli     Verigene Result ESBL Positive    Rapid Influenza A/B [[id number]]  (Normal) Collected: 10/11/23 1214    Order Status: Completed Specimen: Nasopharyngeal Updated: 10/11/23 1402     Influenza A Negative     Influenza B Negative              Pending Diagnostic Studies:     Procedure Component Value Units Date/Time    EKG 12-lead [[id number]]     Order Status:  Sent Lab Status: No result          Medications:  Reconciled Home Medications:      Medication List      START taking these medications    sodium chloride 0.9 % PgBk 100 mL with ertapenem 1 gram SolR 1 g  Inject 1 g into the vein once daily. for 9 doses        CONTINUE taking these medications    levETIRAcetam 1000 MG tablet  Commonly known as: KEPPRA  Take 1,000 mg by mouth 2 (two) times daily.     methocarbamoL 750 MG Tab  Commonly known as: ROBAXIN  Take 750 mg by mouth 3 (three) times daily.     mirtazapine 7.5 MG Tab  Commonly known as: REMERON  Take 7.5 mg by mouth every evening.     oxyCODONE 5 mg Cap  Commonly known as: OXY-IR  Take 5 mg by mouth every 4 (four) hours as needed for Pain.     rosuvastatin 40 MG Tab  Commonly known as: CRESTOR  Take 40 mg by mouth once daily.     SENNA WITH DOCUSATE SODIUM 8.6-50 mg per tablet  Generic drug: senna-docusate 8.6-50 mg  Take 1 tablet by mouth once daily.     VITAMIN D2 50,000 unit Cap  Generic drug: ergocalciferol  Take 50,000 Units by mouth every 7 days.        STOP taking these medications    ciprofloxacin HCl 250 MG tablet  Commonly known as: CIPRO     methenamine 1 gram Tab  Commonly known as: HIPREX            Indwelling Lines/Drains at time of discharge:   Lines/Drains/Airways     Drain  Duration                Suprapubic Catheter -- days                Time spent on the discharge of patient: more 30   minutes         Bipin Montes De Oca MD  Department of Hospital Medicine  [facility]

## 2023-10-13 NOTE — PROGRESS NOTES
[facility]  [facility] Medicine  Progress Note    Patient Name: James Calero  MRN: [id number]  Patient Class: IP- Inpatient   Admission Date: 10/11/2023  Length of Stay: 1 days  Attending Physician: Bipin Montes De Oca MD  Primary Care Provider: Giovanni Wu IV, MD        Subjective:     Principal Problem:<principal problem not specified>        HPI:  Chief complaint:  Altered mental status and bleeding from penis    History of present illness:      Mr. Calero referred to emergency room from [facility] with history of penile bleeding.  Had had some recent altered mental status.  Family states patient is normally able to converse well and help with self-care.  Been less talkative and more sleepy recently.  Had bleeding from penis.  Patient has history of prostate surgery for prostate cancer 20 years earlier done at this facility.  No radiation therapy apparently required.  Patient has had treatment for urinary tract infections several times recently.  Has long history of Gonzalez catheter secondary to retention.  Had split in tip of penis from longstanding Gonzalez catheter.  Followed at [facility].  Family states patient had suprapubic catheter conversion 4 months ago.  When seen in the emergency room was noted to have elevation of fever.  Bleeding from penis area which was addressed by Urology and since stopped.  Had apparent witnessed seizure in the ER.  Hospitalist service asked to admit.     Review of systems:  See above.  Family states patient is generally able to converse, recognize family members and watch TV.  He is able to feed himself.  Requires help otherwise with activities of daily living.  Had CVA with significant left-sided weakness 7 years earlier and has not been ambulatory since.  Snores but no history of previous diagnosis of obstructive sleep apnea.  Review of systems otherwise.    Past medical history:  -cerebrovascular accident with left-sided hemiparesis 7  years earlier (patient is right-handed)  -prostate cancer treated 20 years earlier with surgery  -neurogenic bladder with longstanding Gonzalez catheter recently converted into suprapubic catheter  -recurrent / chronic UTIs  -seizure disorder, seizure manifest by staring into space and occasionally brief loss of consciousness with convulsions.  No tongue biting    Past surgical history:  Surgery for prostate cancer    Allergy to sulfa    Social history:  Nursing home placement for last 7 years  Common law wife helps with patient's decision  Patient has children    Family history:  No one with premature coronary artery disease  Sister on dialysis  Brother on dialysis and also has diabetes    Health maintenance issues:  Family states patient gets all required vaccines at the nursing home and generally gets a flu shot every year and has had all the COVID vaccinations recommended by the nursing home  Patient had COVID-19 in 2020  No prior colonoscopies  Family states patient is a full code            Overview/Hospital Course:  10/12 Feels better. No more fever. Alert and responsive. Family in room. ESBL e Coli in BC. Adjusted ATB. Needs 19 days IV ATB.      Past Medical History:   Diagnosis Date    Diabetes mellitus     GERD (gastroesophageal reflux disease)     History of UTI 01/18/2023    No urine cultures available    Hypercholesterolemia     Hypertension     Malignant neoplasm of prostate     Seizures     Stroke        Past Surgical History:   Procedure Laterality Date    INSERTION OF SUPRAPUBIC CATHETER         Review of patient's allergies indicates:   Allergen Reactions    Sulfadiazine (bulk)        No current facility-administered medications on file prior to encounter.     Current Outpatient Medications on File Prior to Encounter   Medication Sig    ciprofloxacin HCl (CIPRO) 250 MG tablet Take 250 mg by mouth 2 (two) times daily.    ergocalciferol (VITAMIN D2) 50,000 unit Cap Take 50,000 Units by mouth  every 7 days.    levETIRAcetam (KEPPRA) 1000 MG tablet Take 1,000 mg by mouth 2 (two) times daily.    methenamine (HIPREX) 1 gram Tab Take 1 g by mouth 2 (two) times daily.    methocarbamoL (ROBAXIN) 750 MG Tab Take 750 mg by mouth 3 (three) times daily.    mirtazapine (REMERON) 7.5 MG Tab Take 7.5 mg by mouth every evening.    oxyCODONE (OXY-IR) 5 mg Cap Take 5 mg by mouth every 4 (four) hours as needed for Pain.    rosuvastatin (CRESTOR) 40 MG Tab Take 40 mg by mouth once daily.    senna-docusate 8.6-50 mg (SENNA WITH DOCUSATE SODIUM) 8.6-50 mg per tablet Take 1 tablet by mouth once daily.     Family History    None       Tobacco Use    Smoking status: Never    Smokeless tobacco: Never   Substance and Sexual Activity    Alcohol use: Never    Drug use: Never    Sexual activity: Not on file     Review of Systems   Constitutional:  Negative for appetite change, fatigue and fever.   HENT:  Negative for congestion, hearing loss and trouble swallowing.    Respiratory:  Negative for chest tightness, shortness of breath and wheezing.    Cardiovascular:  Negative for chest pain and palpitations.   Gastrointestinal:  Negative for abdominal pain, constipation and nausea.   Genitourinary:  Positive for hematuria. Negative for difficulty urinating and dysuria.   Musculoskeletal:  Positive for gait problem. Negative for back pain and neck stiffness.   Skin:  Negative for pallor and rash.   Neurological:  Negative for dizziness, speech difficulty and headaches.   Psychiatric/Behavioral:  Negative for confusion and suicidal ideas.         Lethargy  Patient able to communicate with answering questions and follows simple commands  One-word answers     Objective:     Vital Signs (Most Recent):  Temp: 98.1 °F (36.7 °C) (10/12/23 1816)  Pulse: 95 (10/12/23 1816)  Resp: 18 (10/12/23 1816)  BP: 105/70 (10/12/23 1816)  SpO2: 95 % (10/12/23 1816) Vital Signs (24h Range):  Temp:  [98.1 °F (36.7 °C)-98.8 °F (37.1 °C)] 98.1 °F  (36.7 °C)  Pulse:  [] 95  Resp:  [17-21] 18  SpO2:  [93 %-99 %] 95 %  BP: ()/(54-72) 105/70     Weight: 91.8 kg (202 lb 6.1 oz)  Body mass index is 28.23 kg/m².     Physical Exam  Vitals reviewed.   Constitutional:       General: He is sleeping. He is not in acute distress.     Appearance: He is well-developed. He is not toxic-appearing.   HENT:      Head: Normocephalic.      Nose: Nose normal.      Mouth/Throat:      Pharynx: Oropharynx is clear.   Eyes:      Extraocular Movements: Extraocular movements intact.      Pupils: Pupils are equal, round, and reactive to light.   Neck:      Thyroid: No thyroid mass.      Vascular: No carotid bruit.   Cardiovascular:      Rate and Rhythm: Normal rate and regular rhythm.      Pulses: Normal pulses.      Heart sounds: Normal heart sounds. No murmur heard.  Pulmonary:      Effort: Pulmonary effort is normal.      Breath sounds: Normal breath sounds and air entry. No wheezing.   Abdominal:      General: Bowel sounds are normal. There is no distension.      Palpations: Abdomen is soft.      Tenderness: There is no abdominal tenderness.      Comments: Suprapubic catheter in lower abdomen   Genitourinary:     Comments: Blood noted in groin area from previous bleeding but no active bleeding at time seen  Musculoskeletal:         General: Normal range of motion.      Cervical back: Neck supple. No rigidity.   Skin:     General: Skin is warm.      Coloration: Skin is not jaundiced.      Findings: No lesion.   Neurological:      General: No focal deficit present.      Mental Status: He is lethargic.      Cranial Nerves: No cranial nerve deficit.      Gait: Gait abnormal.      Comments: Fairly dense paralysis on left side    Will awake and answer questions  Answers 1 word at a time  Is in seems a little sleepy   Psychiatric:         Attention and Perception: Attention normal.         Mood and Affect: Mood normal.         Behavior: Behavior normal. Behavior is cooperative.          Thought Content: Thought content normal.         Cognition and Memory: Cognition normal.              CRANIAL NERVES     CN III, IV, VI   Pupils are equal, round, and reactive to light.       Significant Labs: All pertinent labs within the past 24 hours have been reviewed.  BMP:   Recent Labs   Lab 10/11/23  1135   *      K 3.2*      CO2 25   BUN 22*   CREATININE 1.47*   CALCIUM 8.7   MG 1.8       CBC:   Recent Labs   Lab 10/11/23  1135 10/12/23  0239   WBC 22.31* 15.73*   HGB 15.5 12.6*   HCT 47.8 39.1*   * 75*       CMP:   Recent Labs   Lab 10/11/23  1135 10/12/23  0239     --    K 3.2*  --      --    CO2 25  --    *  --    BUN 22*  --    CREATININE 1.47*  --    CALCIUM 8.7  --    PROT  --  6.2*   ALBUMIN  --  2.6*   BILITOT  --  0.5   ALKPHOS  --  65   AST  --  32   ALT  --  34   ANIONGAP 13  --          Significant Imaging: I have reviewed all pertinent imaging results/findings within the past 24 hours.    Imaging Results              X-Ray Abdomen AP 1 View (Final result)  Result time 10/11/23 19:02:51      Final result by Ulysses Roberts II, MD (10/11/23 19:02:51)                   Impression:      Increased stool volume in the colon, may indicate constipation.      Electronically signed by: Ulysses Roberts  Date:    10/11/2023  Time:    19:02               Narrative:    EXAMINATION:  XR ABDOMEN AP 1 VIEW    CLINICAL HISTORY:  Constipation;    COMPARISON:  30 October 2019    FINDINGS:  No free fluid or free air seen.  Increased stool volume is seen in the colon.  The bowel gas pattern otherwise appears within normal limits.  No abnormal calcifications are present.  No other abnormality is identified.                                       CT Head Without Contrast (Final result)  Result time 10/11/23 13:17:43      Final result by Ulysses Roberts II, MD (10/11/23 13:17:43)                   Impression:      No evidence of abnormality  demonstrated.      Electronically signed by: Ulysses Roberts  Date:    10/11/2023  Time:    13:17               Narrative:    EXAMINATION:  CT HEAD WITHOUT CONTRAST    CLINICAL HISTORY:  Altered mental status, nontraumatic (Ped 0-18y);    TECHNIQUE:  Axial CT imaging of the brain is performed without contrast with 3 mm increments.    CT dose reduction technique used - Dose Rite and tube current modulation.    COMPARISON:  24 March 2019    FINDINGS:  No evidence of hemorrhage, mass, mass effect, midline shift or acute infarct seen.  Chronic changes in volume loss more prominent in the right cerebral hemisphere similar to previous CT otherwise the brain parenchyma attenuation and differentiation appears stable.  The ventricles and cisterns are unchanged in caliber.  Craniotomy changes and aneurysm clip on the right appears stable.  Cranial or skull base abnormality is identified.                                       X-Ray Chest 1 View (Final result)  Result time 10/11/23 11:22:28      Final result by Ulysses Roberts II, MD (10/11/23 11:22:28)                   Impression:      Findings suggest mild cardiac decompensation and / or pneumonitis.      Electronically signed by: Ulysses Roberts  Date:    10/11/2023  Time:    11:22               Narrative:    EXAMINATION:  XR CHEST 1 VIEW    CLINICAL HISTORY:  Sepsis;    COMPARISON:  17 June 2019    TECHNIQUE:  XR CHEST 1 VIEW    FINDINGS:  The heart and mediastinum are stable in size and configuration.  The pulmonary vascularity is slightly increased with bilateral increased interstitial lung density.  No other lung infiltrates, effusions, pneumothorax or other abnormality is demonstrated.                                    Intake/Output - Last 3 Shifts         10/11 0700  10/12 0659 10/12 0700  10/13 0659    I.V. (mL/kg)  197 (2.1)    IV Piggyback 1000 100    Total Intake(mL/kg) 1000 (10.9) 297 (3.2)    Urine (mL/kg/hr)  700 (0.5)    Stool  0    Total Output  700     Net +1000 -403          Stool Occurrence  4 x          Microbiology Results (last 7 days)       Procedure Component Value Units Date/Time    Urine culture [[id number]]  (Abnormal) Collected: 10/11/23 1628    Order Status: Completed Specimen: Urine Updated: 10/12/23 0908     Culture, Urine 25,000 Gram-negative Bacilli     Comment: Identification and Susceptibility to Follow       Verigene Gram-negative Blood Culture Test [[id number]]  (Abnormal) Collected: 10/11/23 1135    Order Status: Completed Specimen: Blood Updated: 10/12/23 0718     Verigene Result E. coli     Verigene Result ESBL Positive    Blood culture x two cultures. Draw prior to antibiotics [[id number]]  (Abnormal) Collected: 10/11/23 1146    Order Status: Completed Specimen: Blood Updated: 10/12/23 0531     Gram Stain Result Gram negative bacilli     Comment: From Aerobic Bottle         Gram negative bacilli     Comment: From Anaerobe bottle       Blood culture x two cultures. Draw prior to antibiotics [[id number]]  (Abnormal) Collected: 10/11/23 1135    Order Status: Completed Specimen: Blood Updated: 10/11/23 2325     Gram Stain Result Gram negative bacilli     Comment: From Anaerobe bottle         Gram negative bacilli     Comment: From Aerobic Bottle       Rapid Influenza A/B [[id number]]  (Normal) Collected: 10/11/23 1214    Order Status: Completed Specimen: Nasopharyngeal Updated: 10/11/23 1402     Influenza A Negative     Influenza B Negative                Assessment/Plan:      Bacteremia due to Escherichia coli    10/12 ESBL E Coli on Merrem plan 10 days  Recheck BC tomorrow.     Encephalopathy, metabolic    10/12 From acute illness resolving    Seizure disorder    Witnessed seizure in the emergency department  Family is not aware of any recent seizures before   Seizures 1st started more than 15 years ago even prior to strokes  Will obtain EEG  Continue with patient's Keppra  I have discussed with family that we do not have neurologist and if  seizures become uncontrolled patient may need to be transferred  Family were okay with patient being admitted here    History of cardioembolic cerebrovascular accident (CVA)    Left-sided weakness  Nonambulatory  (patient is right-handed)    Hematuria    Addressed by Urology    Personal history of prostate cancer    Treated more than 20 years ago by surgery at this facility      VTE Risk Mitigation (From admission, onward)         Ordered     IP VTE LOW RISK PATIENT  Once         10/11/23 1738     Place sequential compression device  Until discontinued         10/11/23 1738                Discharge Planning   ALEX:      Code Status: Full Code   Is the patient medically ready for discharge?:     Reason for patient still in hospital (select all that apply): Laboratory test, Treatment, Imaging and Pending disposition  Discharge Plan A: Return to nursing home                  Bipin Montes De Oca MD  Department of Hospital Medicine   [facility]

## 2023-10-13 NOTE — ASSESSMENT & PLAN NOTE
10/12 ESBL E Coli on Merrem plan 10 days  Recheck BC tomorrow.     10/13 blood cultures repeated  Finish 10 day course of InVanz

## 2023-10-14 VITALS
OXYGEN SATURATION: 98 % | WEIGHT: 202.38 LBS | RESPIRATION RATE: 18 BRPM | HEIGHT: 71 IN | DIASTOLIC BLOOD PRESSURE: 77 MMHG | TEMPERATURE: 99 F | HEART RATE: 75 BPM | SYSTOLIC BLOOD PRESSURE: 115 MMHG | BODY MASS INDEX: 28.33 KG/M2

## 2023-10-14 LAB
BACTERIA BLD CULT: ABNORMAL
BACTERIA BLD CULT: ABNORMAL
GLUCOSE SERPL-MCNC: 179 MG/DL (ref 70–105)
GLUCOSE SERPL-MCNC: 208 MG/DL (ref 70–105)
GLUCOSE SERPL-MCNC: 96 MG/DL (ref 70–105)
GRAM STN SPEC: ABNORMAL

## 2023-10-14 PROCEDURE — 99900035 HC TECH TIME PER 15 MIN (STAT)

## 2023-10-14 PROCEDURE — 63600175 PHARM REV CODE 636 W HCPCS: Performed by: HOSPITALIST

## 2023-10-14 PROCEDURE — 94761 N-INVAS EAR/PLS OXIMETRY MLT: CPT

## 2023-10-14 PROCEDURE — 82962 GLUCOSE BLOOD TEST: CPT

## 2023-10-14 PROCEDURE — 27000221 HC OXYGEN, UP TO 24 HOURS

## 2023-10-14 PROCEDURE — 25000003 PHARM REV CODE 250: Performed by: HOSPITALIST

## 2023-10-14 RX ADMIN — INSULIN ASPART 2 UNITS: 100 INJECTION, SOLUTION INTRAVENOUS; SUBCUTANEOUS at 12:10

## 2023-10-14 RX ADMIN — INSULIN ASPART 4 UNITS: 100 INJECTION, SOLUTION INTRAVENOUS; SUBCUTANEOUS at 09:10

## 2023-10-14 RX ADMIN — ERTAPENEM 1 G: 1 INJECTION INTRAMUSCULAR; INTRAVENOUS at 02:10

## 2023-10-14 RX ADMIN — LEVETIRACETAM 1000 MG: 500 TABLET, FILM COATED ORAL at 09:10

## 2023-10-14 NOTE — PROGRESS NOTES
Patient seemed and continues in good spirits with no new problems reported.  Did not go back to nursing home yesterday but to go back today.  Getting IV antibiotics as scheduled.  No family in room.  No changes in therapy plan    PHYSICAL EXAM:  VS:   Vitals:    10/14/23 0423 10/14/23 0600 10/14/23 0817 10/14/23 1200   BP:  116/75  108/66   Pulse:  81  83   Resp:  16  18   Temp:  99.5 °F (37.5 °C)  98.8 °F (37.1 °C)   TempSrc:  Oral  Oral   SpO2: 98% 99% 99% 95%   Weight:       Height:          Constitutional: adult male; nondistressed  Eyes: anicteric; no corneal arcus  E,N,M,T: NC/AT; moist mucous membranes;   Resp: nonlabored on room air; no wheezing or rales   CV: normal rate, regular rhythm; no murmur or gallop;  no carotid bruit; no lower extremity edema; distal extremities warm   GI: nondistended; soft, nontender; no ventral hernia     MSK:  No temporal wasting; okay  strength; no hot or tender joints   Neuro: CN II-XII grossly intact; light touch sensation intact  Lymphatic: no cervical lymphadenopathy; no supraclavicular lymphadenopathy   Skin: no visible rash or breakdown; no palpable nodule  Psych: fully oriented; normal mood and congruent affect     DATA REVIEW:  Labs: reviewed     Imaging: reviewed and agree with radiology interpretation.    Going to nursing home to complete IV antibiotics as previous scheduled.  See discharge summary from prior day

## 2023-10-14 NOTE — PLAN OF CARE
[facility]  Discharge Final Note    Primary Care Provider: Giovanni Wu IV, MD    Expected Discharge Date: 10/14/2023    Final Discharge Note (most recent)       Final Note - 10/13/23 1326          Final Note    Assessment Type Final Discharge Note     Anticipated Discharge Disposition Skilled Nursing Facility        Post-Acute Status    Discharge Delays None known at this time                     Important Message from Medicare  Important Message from Medicare regarding Discharge Appeal Rights: Given to patient/caregiver, Explained to patient/caregiver, Signed/date by patient/caregiver (verbal- ugo powers)     Date IMM was signed: 10/12/23  Time IMM was signed: 0910     Follow-up providers       Meliton Malloy Jr., MD   Specialty: Urology    [address] [place] [address]   Phone: [phone]       Next Steps: Schedule an appointment as soon as possible for a visit    Instructions: Call Dr. Malloy nurse at [phone] and set up outpatient cystoscopy              After-discharge care                Destination       [facility]   Service: Skilled Nursing    [address] [place] [id number]   Phone: [phone]                             Pt to dc to [facility] on this morning. Dc orders/ summary faxed to facility. Packet previously given to nurse. No further dc needs noted.

## 2023-10-14 NOTE — PLAN OF CARE
Problem: Adult Inpatient Plan of Care  Goal: Plan of Care Review  Outcome: Met  Goal: Patient-Specific Goal (Individualized)  Outcome: Met  Goal: Absence of Hospital-Acquired Illness or Injury  Outcome: Met  Goal: Optimal Comfort and Wellbeing  Outcome: Met  Goal: Readiness for Transition of Care  Outcome: Met     Problem: Skin Injury Risk Increased  Goal: Skin Health and Integrity  Outcome: Met     Problem: Gas Exchange Impaired  Goal: Optimal Gas Exchange  Outcome: Met

## 2023-10-14 NOTE — PLAN OF CARE
Problem: Adult Inpatient Plan of Care  Goal: Plan of Care Review  Outcome: Ongoing, Progressing  Goal: Patient-Specific Goal (Individualized)  Outcome: Ongoing, Progressing  Goal: Absence of Hospital-Acquired Illness or Injury  Outcome: Ongoing, Progressing  Goal: Optimal Comfort and Wellbeing  Outcome: Ongoing, Progressing  Goal: Readiness for Transition of Care  Outcome: Ongoing, Progressing     Problem: Skin Injury Risk Increased  Goal: Skin Health and Integrity  Outcome: Ongoing, Progressing     Problem: Gas Exchange Impaired  Goal: Optimal Gas Exchange  Outcome: Ongoing, Progressing     Problem: Gas Exchange Impaired  Goal: Optimal Gas Exchange  Outcome: Ongoing, Progressing

## 2023-10-16 DIAGNOSIS — R31.9 HEMATURIA, UNSPECIFIED TYPE: Primary | ICD-10-CM

## 2023-10-19 LAB
BACTERIA BLD CULT: NORMAL
BACTERIA BLD CULT: NORMAL

## 2023-10-19 NOTE — PHYSICIAN QUERY
PT Name: James Calero  MR #: [id number]     DOCUMENTATION CLARIFICATION      CDS:  Isaak MORE, RN   Contact information:  [email or web address]  This form is a permanent document in the medical record.     Query Date: October 19, 2023    By submitting this query, we are merely seeking further clarification of documentation.  Please utilize your independent clinical judgment when addressing the question(s) below.     The Medical Record contains the following:    Clinical Information Location in Medical Record   Mr. Calero referred to emergency room from [facility] with history of penile bleeding.  Had had some recent altered mental status.  Family states patient is normally able to converse well and help with self-care.  Been less talkative and more sleepy recently.  Had bleeding from penis.  Patient has history of prostate surgery for prostate cancer 20 years earlier done at this facility.  No radiation therapy apparently required.  Patient has had treatment for urinary tract infections several times recently.  Has long history of Gonzalez catheter secondary to retention.  Had split in tip of penis from longstanding Gonzalez catheter.  Followed at [facility].  Family states patient had suprapubic catheter conversion 4 months ago.  When seen in the emergency room was noted to have elevation of fever.  Bleeding from penis area which was addressed by Urology and since stopped.     DCS 10/13/2023    patient feels much better without fever.  No new issues.  Blood cultures repeated.  Blood cultures with ESBL E coli.  Reviewed C&S from ESBL E coli in urine.  Nursing home can take patient back to finish InVanz to complete 10 days.  Patient to follow up with Dr. Malloy in 1 month with changing out suprapubic tube and for outpatient cystoscopy DCS 10/13/203   Blood Culture x 2: Escherichia coli ESBL   Verigene Gram-negative Blood Culture: E. coli   Urine Culture: 25,000 Escherichia coli ESBL    Labs  10/11/2023     Please document your best medical opinion regarding the etiology of infection ?       [  X  ] UTI   [    ] Other etiology (please specify):___________________   [  ] Clinically Undetermined     Please document in your progress notes daily for the duration of treatment, until resolved, and include in your discharge summary.

## 2023-10-19 NOTE — PHYSICIAN QUERY
PT Name: James Calero  MR #: [id number]     DOCUMENTATION CLARIFICATION     CDS:  Judi MORE, RN   Contact information:  [email or web address]     This form is a permanent document in the medical record.     Query Date: October 19, 2023    By submitting this query, we are merely seeking further clarification of documentation.  Please utilize your independent clinical judgment when addressing the question(s) below.  The Medical Record contains the following:  Indicators Supporting Clinical Findings Location in Medical Record   x HR         RR          BP        Temp Temp 101.9               RR 43      BP  166/108 ED VS 10/11/2023   x Lactic Acid          Procalcitonin Lactic Acid 3.6 Lab 10/11/2023   x WBC           Bands          CRP WBC 22.31   CRP 10.90 Lab 10/11/2023   x Culture(s) Blood Culture x 2:  Escherichia coli ESBL  Verigene Gram-negative Blood Culture: E. coli   Urine Culture:  25,000 Escherichia coli ESBL Lab 10/11/2023   x AMS, Confusion, LOC, etc. Had had some recent altered mental status.  Family states patient is normally able to converse well and help with self-care.  Been less talkative and more sleepy recently. H&P 10/11/2023    Organ Dysfunction/Failure     x Bacteremia or Sepsis / Septic Patient comes in with fever underlying sepsis    Bacteremia due to Escherichia coli ED PN 10/11/2023    DCS  10/13/2023    Known or Suspected Source of Infection documented      (Failed) Outpatient Treatment      Medication     x Treatment ZOSYN 4.5 g in D5W  100 mL IVPB  1 Time   Indications of Use: Bacteremia  ZOSYN  4.5 g D5W 100 mL IVPB  Every 8 hours     Indications of Use: Bacteremia    MERREM 1 g in sodium chloride 0.9 % 100 mL IVPB  Every 8 hours Indications of Use: Bacteremia    INVANZ  1 g in sodium chloride 0.9 % 100 mL IVPB  Every 24 hours Indications of Use: Bacteremia       MD orders 10/11/2023      MD orders 10/12/2023      MD orders 10/13/2023   x Other Feels better. No  more fever. Alert and responsive. Family in room. ESBL e Coli in BC. Adjusted ATB. Needs 19 days IV ATB.    Nursing home can take patient back to finish InVanz to complete 10 days PN 10/12/2023    DCS 10/13/2023        Provider, please clarify diagnosis or diagnoses associated with above clinical findings.  [    ] Sepsis with Bacteremia 2/2  Escherichia coli   [  X ] Bacteremia without Sepsis   [    ] Other Infectious Disease (please specify): __________     Please document in your progress notes daily for the duration of treatment until resolved and include in your discharge summary.

## 2023-10-24 ENCOUNTER — OUTSIDE PLACE OF SERVICE (OUTPATIENT)
Dept: FAMILY MEDICINE | Facility: CLINIC | Age: 68
End: 2023-10-24
Payer: MEDICARE

## 2023-10-24 PROCEDURE — 99309 SBSQ NF CARE MODERATE MDM 30: CPT | Mod: ,,, | Performed by: FAMILY MEDICINE

## 2023-10-24 PROCEDURE — 99309 PR NURSING FAC CARE, SUBSEQ, SIGNIF COMPLIC: ICD-10-PCS | Mod: ,,, | Performed by: FAMILY MEDICINE

## 2023-10-27 NOTE — PHYSICIAN QUERY
PT Name: James Calero  MR #: [id number]     DOCUMENTATION CLARIFICATION     CDS:  Judi MORE, RN   Contact information:  [email or web address]     This form is a permanent document in the medical record.    Query Date: October 27, 2023    By submitting this query, we are merely seeking further clarification of documentation to reflect the severity of illness of your patient. Please utilize your independent clinical judgment when addressing the question(s) below.    The Medical Record reflects the following:     Indicators   Supporting Clinical Findings Location in Medical Record   x Lab Value(s) Potassium 3.2 Lab 10/11/23   x Treatment/Medication 0.45% NaCl with KCl 20 mEq infusion  Intravenous Continuous @ 75 mL/hr    Start Date/Time: 10/11/23 1900   End Date/Time: 10/13/23 1513    MD orders 10/11/2023    Other       Provider, please specify the diagnosis  that correspond(s) to the above indicators.   [ x  ] Hypokalemia   [   ] Other electrolyte disturbance (please specify): __________       Please document in your progress notes daily for the duration of treatment until resolved, and include in your discharge summary.

## 2024-03-13 ENCOUNTER — OUTSIDE PLACE OF SERVICE (OUTPATIENT)
Dept: FAMILY MEDICINE | Facility: CLINIC | Age: 69
End: 2024-03-13
Payer: MEDICARE

## 2024-03-13 PROCEDURE — 99309 SBSQ NF CARE MODERATE MDM 30: CPT | Mod: ,,, | Performed by: FAMILY MEDICINE

## 2024-04-10 DIAGNOSIS — I65.29 CAROTID ARTERY STENOSIS: Primary | ICD-10-CM

## 2024-05-01 ENCOUNTER — INITIAL CONSULT (OUTPATIENT)
Dept: VASCULAR SURGERY | Facility: CLINIC | Age: 69
End: 2024-05-01
Payer: MEDICARE

## 2024-05-01 DIAGNOSIS — Z86.73 HISTORY OF STROKE: Primary | ICD-10-CM

## 2024-05-01 DIAGNOSIS — I65.29 CAROTID ARTERY STENOSIS: ICD-10-CM

## 2024-05-01 PROCEDURE — 99202 OFFICE O/P NEW SF 15 MIN: CPT | Mod: S$PBB,,, | Performed by: SURGERY

## 2024-05-01 PROCEDURE — 99213 OFFICE O/P EST LOW 20 MIN: CPT | Mod: PBBFAC | Performed by: SURGERY

## 2024-05-01 NOTE — PROGRESS NOTES
Subjective:       Patient ID: James Calero is a 68 y.o. male.    Chief Complaint: Carotid Artery Disease  New consultation of the asked to evaluate for carotid artery disease.  I have reviewed his carotid Doppler studies.  Bilateral ratio suggests ICA is less than 50%.  Some increased velocities in the external on left.  Patient's clinical history is previous stroke with extensive left body hemiparesis apparently nonambulatory unable to dress himself discussed with the patient he is understanding of the situation and told him there has no indications for any further vascular workup or were surgical intervention  family history is not on file.  Past Medical History:   Diagnosis Date    Diabetes mellitus     GERD (gastroesophageal reflux disease)     History of UTI 01/18/2023    No urine cultures available    Hypercholesterolemia     Hypertension     Malignant neoplasm of prostate     Seizures     Stroke       Past Surgical History:   Procedure Laterality Date    INSERTION OF SUPRAPUBIC CATHETER         reports that he has never smoked. He has never used smokeless tobacco. He reports that he does not drink alcohol and does not use drugs.   HPI  Review of Systems      Objective:      There were no vitals taken for this visit.   Physical Exam  Constitutional:       Appearance: Normal appearance.   Cardiovascular:      Rate and Rhythm: Normal rate.   Genitourinary:     Comments: Indwelling Gonzalez catheter chronic  Musculoskeletal:      Comments: Left body hemiparesis with contractures   Skin:     General: Skin is warm.   Neurological:      Mental Status: He is alert.           Assessment:       1. History of stroke    2. Carotid artery stenosis        Plan:       Recommend no further workup.  Continue risk factor modification follow up as needed with me

## 2024-05-22 ENCOUNTER — OUTSIDE PLACE OF SERVICE (OUTPATIENT)
Dept: FAMILY MEDICINE | Facility: CLINIC | Age: 69
End: 2024-05-22
Payer: MEDICARE

## 2024-05-22 PROCEDURE — 99309 SBSQ NF CARE MODERATE MDM 30: CPT | Mod: ,,, | Performed by: FAMILY MEDICINE

## 2024-07-30 ENCOUNTER — OUTSIDE PLACE OF SERVICE (OUTPATIENT)
Dept: FAMILY MEDICINE | Facility: CLINIC | Age: 69
End: 2024-07-30
Payer: MEDICARE

## 2024-07-30 PROCEDURE — 99309 SBSQ NF CARE MODERATE MDM 30: CPT | Mod: ,,, | Performed by: FAMILY MEDICINE

## 2024-08-22 ENCOUNTER — HOSPITAL ENCOUNTER (INPATIENT)
Facility: HOSPITAL | Age: 69
LOS: 4 days | Discharge: HOME | DRG: 871 | End: 2024-08-26
Attending: EMERGENCY MEDICINE | Admitting: STUDENT IN AN ORGANIZED HEALTH CARE EDUCATION/TRAINING PROGRAM
Payer: MEDICARE

## 2024-08-22 DIAGNOSIS — B96.20 BACTEREMIA DUE TO ESCHERICHIA COLI: ICD-10-CM

## 2024-08-22 DIAGNOSIS — I21.4 NSTEMI (NON-ST ELEVATED MYOCARDIAL INFARCTION): ICD-10-CM

## 2024-08-22 DIAGNOSIS — R78.81 BACTEREMIA DUE TO PROTEUS SPECIES: ICD-10-CM

## 2024-08-22 DIAGNOSIS — K56.600 PARTIAL SMALL BOWEL OBSTRUCTION: ICD-10-CM

## 2024-08-22 DIAGNOSIS — I21.4 NSTEMI (NON-ST ELEVATION MYOCARDIAL INFARCTION): ICD-10-CM

## 2024-08-22 DIAGNOSIS — B96.4 BACTEREMIA DUE TO PROTEUS SPECIES: ICD-10-CM

## 2024-08-22 DIAGNOSIS — N17.9 AKI (ACUTE KIDNEY INJURY): ICD-10-CM

## 2024-08-22 DIAGNOSIS — R11.0 NAUSEA: ICD-10-CM

## 2024-08-22 DIAGNOSIS — R78.81 BACTEREMIA DUE TO ESCHERICHIA COLI: ICD-10-CM

## 2024-08-22 DIAGNOSIS — A41.9 SEVERE SEPSIS: ICD-10-CM

## 2024-08-22 DIAGNOSIS — R65.20 SEVERE SEPSIS: ICD-10-CM

## 2024-08-22 DIAGNOSIS — J18.9 PNEUMONIA DUE TO INFECTIOUS ORGANISM, UNSPECIFIED LATERALITY, UNSPECIFIED PART OF LUNG: Primary | ICD-10-CM

## 2024-08-22 DIAGNOSIS — R06.00 DYSPNEA: ICD-10-CM

## 2024-08-22 PROBLEM — Z87.898 HISTORY OF SEIZURE: Status: ACTIVE | Noted: 2023-10-11

## 2024-08-22 PROBLEM — R56.9 SEIZURES: Status: ACTIVE | Noted: 2023-10-11

## 2024-08-22 PROBLEM — R06.03 RESPIRATORY DISTRESS: Status: ACTIVE | Noted: 2024-08-22

## 2024-08-22 LAB
ALBUMIN SERPL BCP-MCNC: 3.6 G/DL (ref 3.5–5)
ALBUMIN/GLOB SERPL: 0.8 {RATIO}
ALP SERPL-CCNC: 80 U/L (ref 45–115)
ALT SERPL W P-5'-P-CCNC: 52 U/L (ref 16–61)
ANION GAP SERPL CALCULATED.3IONS-SCNC: 15 MMOL/L (ref 7–16)
ANISOCYTOSIS BLD QL SMEAR: ABNORMAL
APTT PPP: 32.5 SECONDS (ref 25.2–37.3)
AST SERPL W P-5'-P-CCNC: 41 U/L (ref 15–37)
BACTERIA #/AREA URNS HPF: ABNORMAL /HPF
BASOPHILS # BLD AUTO: 0.11 K/UL (ref 0–0.2)
BASOPHILS NFR BLD AUTO: 0.5 % (ref 0–1)
BILIRUB SERPL-MCNC: 0.5 MG/DL (ref ?–1.2)
BILIRUB UR QL STRIP: ABNORMAL
BUN SERPL-MCNC: 27 MG/DL (ref 7–18)
BUN/CREAT SERPL: 17 (ref 6–20)
CALCIUM SERPL-MCNC: 9.2 MG/DL (ref 8.5–10.1)
CHLORIDE SERPL-SCNC: 110 MMOL/L (ref 98–107)
CLARITY UR: ABNORMAL
CO2 SERPL-SCNC: 20 MMOL/L (ref 21–32)
COLOR UR: ABNORMAL
CREAT SERPL-MCNC: 1.57 MG/DL (ref 0.7–1.3)
DIFFERENTIAL METHOD BLD: ABNORMAL
EGFR (NO RACE VARIABLE) (RUSH/TITUS): 48 ML/MIN/1.73M2
EOSINOPHIL # BLD AUTO: 3.68 K/UL (ref 0–0.5)
EOSINOPHIL NFR BLD AUTO: 16.1 % (ref 1–4)
ERYTHROCYTE [DISTWIDTH] IN BLOOD BY AUTOMATED COUNT: 14.8 % (ref 11.5–14.5)
GLOBULIN SER-MCNC: 4.6 G/DL (ref 2–4)
GLUCOSE SERPL-MCNC: 129 MG/DL (ref 74–106)
GLUCOSE UR STRIP-MCNC: ABNORMAL MG/DL
HCO3 UR-SCNC: 19.7 MMOL/L (ref 21–28)
HCO3 UR-SCNC: 20.2 MMOL/L (ref 21–28)
HCT VFR BLD AUTO: 50.5 % (ref 40–54)
HCT VFR BLD CALC: 45 % (ref 35–51)
HCT VFR BLD CALC: 47 % (ref 35–51)
HGB BLD-MCNC: 15.7 G/DL (ref 13.5–18)
IMM GRANULOCYTES # BLD AUTO: 0.44 K/UL (ref 0–0.04)
IMM GRANULOCYTES NFR BLD: 1.9 % (ref 0–0.4)
INR BLD: 1.2
KETONES UR STRIP-SCNC: ABNORMAL MMOL/L
LACTATE SERPL-SCNC: 2.3 MMOL/L (ref 0.4–2)
LACTATE SERPL-SCNC: 2.8 MMOL/L (ref 0.4–2)
LACTATE SERPL-SCNC: 3.4 MMOL/L (ref 0.4–2)
LACTATE SERPL-SCNC: 4.4 MMOL/L (ref 0.4–2)
LACTATE SERPL-SCNC: 4.9 MMOL/L (ref 0.4–2)
LACTATE SERPL-SCNC: 4.9 MMOL/L (ref 0.4–2)
LACTATE SERPL-SCNC: 5 MMOL/L (ref 0.4–2)
LDH SERPL L TO P-CCNC: 5.7 MMOL/L (ref 0.3–1.2)
LDH SERPL L TO P-CCNC: 5.8 MMOL/L (ref 0.3–1.2)
LEUKOCYTE ESTERASE UR QL STRIP: ABNORMAL
LYMPHOCYTES # BLD AUTO: 1.08 K/UL (ref 1–4.8)
LYMPHOCYTES NFR BLD AUTO: 4.7 % (ref 27–41)
LYMPHOCYTES NFR BLD MANUAL: 7 % (ref 27–41)
MAGNESIUM SERPL-MCNC: 1.7 MG/DL (ref 1.7–2.3)
MCH RBC QN AUTO: 27.9 PG (ref 27–31)
MCHC RBC AUTO-ENTMCNC: 31.1 G/DL (ref 32–36)
MCV RBC AUTO: 89.9 FL (ref 80–96)
METAMYELOCYTES NFR BLD MANUAL: 2 %
MONOCYTES # BLD AUTO: 1.21 K/UL (ref 0–0.8)
MONOCYTES NFR BLD AUTO: 5.3 % (ref 2–6)
MONOCYTES NFR BLD MANUAL: 5 % (ref 2–6)
MPC BLD CALC-MCNC: 11.1 FL (ref 9.4–12.4)
NEUTROPHILS # BLD AUTO: 16.37 K/UL (ref 1.8–7.7)
NEUTROPHILS NFR BLD AUTO: 71.5 % (ref 53–65)
NEUTS BAND NFR BLD MANUAL: 24 % (ref 1–5)
NEUTS SEG NFR BLD MANUAL: 62 % (ref 50–62)
NITRITE UR QL STRIP: ABNORMAL
NRBC # BLD AUTO: 0 X10E3/UL
NRBC, AUTO (.00): 0 %
NT-PROBNP SERPL-MCNC: 9731 PG/ML (ref 1–125)
OHS QRS DURATION: 82 MS
OHS QRS DURATION: 86 MS
OHS QTC CALCULATION: 391 MS
OHS QTC CALCULATION: 419 MS
PCO2 BLDA: 35 MMHG (ref 35–48)
PCO2 BLDA: 41 MMHG (ref 35–48)
PH SMN: 7.29 [PH] (ref 7.35–7.45)
PH SMN: 7.37 [PH] (ref 7.35–7.45)
PH UR STRIP: ABNORMAL [PH]
PLATELET # BLD AUTO: 102 K/UL (ref 150–400)
PLATELET MORPHOLOGY: ABNORMAL
PO2 BLDA: 71 MMHG (ref 83–108)
PO2 BLDA: 88 MMHG (ref 83–108)
POC BASE EXCESS: -4.3 MMOL/L (ref -2–3)
POC BASE EXCESS: -6.6 MMOL/L (ref -2–3)
POC CO2: 21 MMOL/L
POC CO2: 21.3 MMOL/L
POC IONIZED CALCIUM: 1.04 MMOL/L (ref 1.15–1.35)
POC IONIZED CALCIUM: 1.05 MMOL/L (ref 1.15–1.35)
POC SATURATED O2: 92 % (ref 95–98)
POC SATURATED O2: 96 % (ref 95–98)
POCT GLUCOSE: 117 MG/DL (ref 60–95)
POCT GLUCOSE: 142 MG/DL (ref 60–95)
POTASSIUM BLD-SCNC: 3.6 MMOL/L (ref 3.4–4.5)
POTASSIUM BLD-SCNC: 3.8 MMOL/L (ref 3.4–4.5)
POTASSIUM SERPL-SCNC: 3.9 MMOL/L (ref 3.5–5.1)
PROT SERPL-MCNC: 8.2 G/DL (ref 6.4–8.2)
PROT UR QL STRIP: ABNORMAL
PROTHROMBIN TIME: 15.1 SECONDS (ref 11.7–14.7)
RBC # BLD AUTO: 5.62 M/UL (ref 4.6–6.2)
RBC # UR STRIP: ABNORMAL /UL
RBC #/AREA URNS HPF: >182 /HPF
SARS-COV-2 RDRP RESP QL NAA+PROBE: NEGATIVE
SARS-COV-2 RDRP RESP QL NAA+PROBE: NORMAL
SODIUM BLD-SCNC: 140 MMOL/L (ref 136–145)
SODIUM BLD-SCNC: 143 MMOL/L (ref 136–145)
SODIUM SERPL-SCNC: 141 MMOL/L (ref 136–145)
SP GR UR STRIP: ABNORMAL
TROPONIN I SERPL DL<=0.01 NG/ML-MCNC: 1904.7 PG/ML
TROPONIN I SERPL DL<=0.01 NG/ML-MCNC: 2131.5 PG/ML
TROPONIN I SERPL DL<=0.01 NG/ML-MCNC: 2963.5 PG/ML
UROBILINOGEN UR STRIP-ACNC: ABNORMAL
WBC # BLD AUTO: 22.89 K/UL (ref 4.5–11)
WBC #/AREA URNS HPF: 4 /HPF
WBC CLUMPS, UA: ABNORMAL /HPF

## 2024-08-22 PROCEDURE — 85730 THROMBOPLASTIN TIME PARTIAL: CPT | Performed by: EMERGENCY MEDICINE

## 2024-08-22 PROCEDURE — 25000003 PHARM REV CODE 250: Performed by: STUDENT IN AN ORGANIZED HEALTH CARE EDUCATION/TRAINING PROGRAM

## 2024-08-22 PROCEDURE — 63600175 PHARM REV CODE 636 W HCPCS

## 2024-08-22 PROCEDURE — 36415 COLL VENOUS BLD VENIPUNCTURE: CPT | Performed by: EMERGENCY MEDICINE

## 2024-08-22 PROCEDURE — 87077 CULTURE AEROBIC IDENTIFY: CPT | Performed by: EMERGENCY MEDICINE

## 2024-08-22 PROCEDURE — 85014 HEMATOCRIT: CPT

## 2024-08-22 PROCEDURE — 80053 COMPREHEN METABOLIC PANEL: CPT | Performed by: EMERGENCY MEDICINE

## 2024-08-22 PROCEDURE — 63600175 PHARM REV CODE 636 W HCPCS: Performed by: EMERGENCY MEDICINE

## 2024-08-22 PROCEDURE — 81001 URINALYSIS AUTO W/SCOPE: CPT | Performed by: EMERGENCY MEDICINE

## 2024-08-22 PROCEDURE — 63600175 PHARM REV CODE 636 W HCPCS: Performed by: STUDENT IN AN ORGANIZED HEALTH CARE EDUCATION/TRAINING PROGRAM

## 2024-08-22 PROCEDURE — 82803 BLOOD GASES ANY COMBINATION: CPT

## 2024-08-22 PROCEDURE — 20000000 HC ICU ROOM

## 2024-08-22 PROCEDURE — 36415 COLL VENOUS BLD VENIPUNCTURE: CPT | Performed by: STUDENT IN AN ORGANIZED HEALTH CARE EDUCATION/TRAINING PROGRAM

## 2024-08-22 PROCEDURE — 99900035 HC TECH TIME PER 15 MIN (STAT)

## 2024-08-22 PROCEDURE — 83605 ASSAY OF LACTIC ACID: CPT

## 2024-08-22 PROCEDURE — 99285 EMERGENCY DEPT VISIT HI MDM: CPT | Mod: 25

## 2024-08-22 PROCEDURE — 87186 SC STD MICRODIL/AGAR DIL: CPT | Performed by: EMERGENCY MEDICINE

## 2024-08-22 PROCEDURE — 94761 N-INVAS EAR/PLS OXIMETRY MLT: CPT

## 2024-08-22 PROCEDURE — 27000190 HC CPAP FULL FACE MASK W/VALVE

## 2024-08-22 PROCEDURE — 96361 HYDRATE IV INFUSION ADD-ON: CPT

## 2024-08-22 PROCEDURE — 25000003 PHARM REV CODE 250: Performed by: EMERGENCY MEDICINE

## 2024-08-22 PROCEDURE — 82330 ASSAY OF CALCIUM: CPT

## 2024-08-22 PROCEDURE — 96365 THER/PROPH/DIAG IV INF INIT: CPT | Mod: 59

## 2024-08-22 PROCEDURE — 87040 BLOOD CULTURE FOR BACTERIA: CPT | Performed by: EMERGENCY MEDICINE

## 2024-08-22 PROCEDURE — 93005 ELECTROCARDIOGRAM TRACING: CPT

## 2024-08-22 PROCEDURE — 83735 ASSAY OF MAGNESIUM: CPT | Performed by: EMERGENCY MEDICINE

## 2024-08-22 PROCEDURE — 83880 ASSAY OF NATRIURETIC PEPTIDE: CPT | Performed by: EMERGENCY MEDICINE

## 2024-08-22 PROCEDURE — 99900031 HC PATIENT EDUCATION (STAT)

## 2024-08-22 PROCEDURE — 25000242 PHARM REV CODE 250 ALT 637 W/ HCPCS: Performed by: EMERGENCY MEDICINE

## 2024-08-22 PROCEDURE — 94640 AIRWAY INHALATION TREATMENT: CPT | Mod: XB

## 2024-08-22 PROCEDURE — 96376 TX/PRO/DX INJ SAME DRUG ADON: CPT

## 2024-08-22 PROCEDURE — 96375 TX/PRO/DX INJ NEW DRUG ADDON: CPT

## 2024-08-22 PROCEDURE — 99291 CRITICAL CARE FIRST HOUR: CPT | Mod: ,,, | Performed by: STUDENT IN AN ORGANIZED HEALTH CARE EDUCATION/TRAINING PROGRAM

## 2024-08-22 PROCEDURE — 82947 ASSAY GLUCOSE BLOOD QUANT: CPT

## 2024-08-22 PROCEDURE — 84132 ASSAY OF SERUM POTASSIUM: CPT

## 2024-08-22 PROCEDURE — 87149 DNA/RNA DIRECT PROBE: CPT | Performed by: EMERGENCY MEDICINE

## 2024-08-22 PROCEDURE — 84484 ASSAY OF TROPONIN QUANT: CPT | Performed by: EMERGENCY MEDICINE

## 2024-08-22 PROCEDURE — 84484 ASSAY OF TROPONIN QUANT: CPT | Performed by: STUDENT IN AN ORGANIZED HEALTH CARE EDUCATION/TRAINING PROGRAM

## 2024-08-22 PROCEDURE — 85025 COMPLETE CBC W/AUTO DIFF WBC: CPT | Performed by: EMERGENCY MEDICINE

## 2024-08-22 PROCEDURE — 87635 SARS-COV-2 COVID-19 AMP PRB: CPT | Performed by: EMERGENCY MEDICINE

## 2024-08-22 PROCEDURE — 94660 CPAP INITIATION&MGMT: CPT

## 2024-08-22 PROCEDURE — 81003 URINALYSIS AUTO W/O SCOPE: CPT | Performed by: EMERGENCY MEDICINE

## 2024-08-22 PROCEDURE — 85610 PROTHROMBIN TIME: CPT | Performed by: EMERGENCY MEDICINE

## 2024-08-22 PROCEDURE — 36600 WITHDRAWAL OF ARTERIAL BLOOD: CPT

## 2024-08-22 PROCEDURE — 83605 ASSAY OF LACTIC ACID: CPT | Performed by: EMERGENCY MEDICINE

## 2024-08-22 PROCEDURE — 84295 ASSAY OF SERUM SODIUM: CPT

## 2024-08-22 PROCEDURE — 5A09357 ASSISTANCE WITH RESPIRATORY VENTILATION, LESS THAN 24 CONSECUTIVE HOURS, CONTINUOUS POSITIVE AIRWAY PRESSURE: ICD-10-PCS | Performed by: STUDENT IN AN ORGANIZED HEALTH CARE EDUCATION/TRAINING PROGRAM

## 2024-08-22 PROCEDURE — 27000221 HC OXYGEN, UP TO 24 HOURS

## 2024-08-22 RX ORDER — MAGNESIUM SULFATE HEPTAHYDRATE 40 MG/ML
2 INJECTION, SOLUTION INTRAVENOUS
Status: DISCONTINUED | OUTPATIENT
Start: 2024-08-22 | End: 2024-08-22

## 2024-08-22 RX ORDER — LATANOPROST 50 UG/ML
1 SOLUTION/ DROPS OPHTHALMIC NIGHTLY
Status: DISCONTINUED | OUTPATIENT
Start: 2024-08-22 | End: 2024-08-26 | Stop reason: HOSPADM

## 2024-08-22 RX ORDER — METHOCARBAMOL 750 MG/1
750 TABLET, FILM COATED ORAL 3 TIMES DAILY
COMMUNITY
Start: 2024-07-22

## 2024-08-22 RX ORDER — LINEZOLID 2 MG/ML
600 INJECTION, SOLUTION INTRAVENOUS
Status: DISCONTINUED | OUTPATIENT
Start: 2024-08-22 | End: 2024-08-23

## 2024-08-22 RX ORDER — ONDANSETRON HYDROCHLORIDE 2 MG/ML
4 INJECTION, SOLUTION INTRAVENOUS EVERY 8 HOURS PRN
Status: DISCONTINUED | OUTPATIENT
Start: 2024-08-22 | End: 2024-08-26 | Stop reason: HOSPADM

## 2024-08-22 RX ORDER — IPRATROPIUM BROMIDE AND ALBUTEROL SULFATE 2.5; .5 MG/3ML; MG/3ML
3 SOLUTION RESPIRATORY (INHALATION) EVERY 6 HOURS PRN
Status: DISCONTINUED | OUTPATIENT
Start: 2024-08-22 | End: 2024-08-26 | Stop reason: HOSPADM

## 2024-08-22 RX ORDER — ALBUTEROL SULFATE 0.83 MG/ML
2.5 SOLUTION RESPIRATORY (INHALATION)
Status: COMPLETED | OUTPATIENT
Start: 2024-08-22 | End: 2024-08-22

## 2024-08-22 RX ORDER — SODIUM CHLORIDE, SODIUM GLUCONATE, SODIUM ACETATE, POTASSIUM CHLORIDE AND MAGNESIUM CHLORIDE 30; 37; 368; 526; 502 MG/100ML; MG/100ML; MG/100ML; MG/100ML; MG/100ML
INJECTION, SOLUTION INTRAVENOUS CONTINUOUS
Status: DISCONTINUED | OUTPATIENT
Start: 2024-08-22 | End: 2024-08-23

## 2024-08-22 RX ORDER — IPRATROPIUM BROMIDE AND ALBUTEROL SULFATE 2.5; .5 MG/3ML; MG/3ML
6 SOLUTION RESPIRATORY (INHALATION)
Status: COMPLETED | OUTPATIENT
Start: 2024-08-22 | End: 2024-08-22

## 2024-08-22 RX ORDER — MUPIROCIN 20 MG/G
OINTMENT TOPICAL 2 TIMES DAILY
Status: DISCONTINUED | OUTPATIENT
Start: 2024-08-22 | End: 2024-08-26 | Stop reason: HOSPADM

## 2024-08-22 RX ORDER — METHYLPREDNISOLONE SOD SUCC 125 MG
125 VIAL (EA) INJECTION
Status: COMPLETED | OUTPATIENT
Start: 2024-08-22 | End: 2024-08-22

## 2024-08-22 RX ORDER — ONDANSETRON HYDROCHLORIDE 2 MG/ML
4 INJECTION, SOLUTION INTRAVENOUS
Status: COMPLETED | OUTPATIENT
Start: 2024-08-22 | End: 2024-08-22

## 2024-08-22 RX ORDER — ONDANSETRON HYDROCHLORIDE 2 MG/ML
INJECTION, SOLUTION INTRAVENOUS
Status: COMPLETED
Start: 2024-08-22 | End: 2024-08-22

## 2024-08-22 RX ORDER — METHOCARBAMOL 750 MG/1
750 TABLET, FILM COATED ORAL 3 TIMES DAILY
Status: DISCONTINUED | OUTPATIENT
Start: 2024-08-22 | End: 2024-08-26 | Stop reason: HOSPADM

## 2024-08-22 RX ORDER — LATANOPROST 50 UG/ML
1 SOLUTION/ DROPS OPHTHALMIC NIGHTLY
COMMUNITY
Start: 2024-03-19

## 2024-08-22 RX ORDER — LEVETIRACETAM 500 MG/1
1000 TABLET ORAL 2 TIMES DAILY
Status: DISCONTINUED | OUTPATIENT
Start: 2024-08-22 | End: 2024-08-26 | Stop reason: HOSPADM

## 2024-08-22 RX ORDER — ERGOCALCIFEROL 1.25 MG/1
50000 CAPSULE ORAL
Status: DISCONTINUED | OUTPATIENT
Start: 2024-08-22 | End: 2024-08-26 | Stop reason: HOSPADM

## 2024-08-22 RX ORDER — ACETAMINOPHEN 325 MG/1
650 TABLET ORAL
Status: COMPLETED | OUTPATIENT
Start: 2024-08-22 | End: 2024-08-22

## 2024-08-22 RX ORDER — SODIUM CHLORIDE 0.9 % (FLUSH) 0.9 %
10 SYRINGE (ML) INJECTION
Status: DISCONTINUED | OUTPATIENT
Start: 2024-08-22 | End: 2024-08-26 | Stop reason: HOSPADM

## 2024-08-22 RX ORDER — MULTIVITAMIN
1 TABLET ORAL DAILY
COMMUNITY

## 2024-08-22 RX ORDER — MAGNESIUM SULFATE HEPTAHYDRATE 40 MG/ML
2 INJECTION, SOLUTION INTRAVENOUS
Status: COMPLETED | OUTPATIENT
Start: 2024-08-22 | End: 2024-08-22

## 2024-08-22 RX ADMIN — MAGNESIUM SULFATE IN WATER FOR 2 G: 40 INJECTION INTRAVENOUS at 11:08

## 2024-08-22 RX ADMIN — MEROPENEM 1 G: 1 INJECTION, POWDER, FOR SOLUTION INTRAVENOUS at 09:08

## 2024-08-22 RX ADMIN — LEVETIRACETAM 1000 MG: 500 TABLET, FILM COATED ORAL at 01:08

## 2024-08-22 RX ADMIN — ONDANSETRON 4 MG: 2 INJECTION INTRAMUSCULAR; INTRAVENOUS at 07:08

## 2024-08-22 RX ADMIN — MUPIROCIN: 20 OINTMENT TOPICAL at 09:08

## 2024-08-22 RX ADMIN — LATANOPROST 1 DROP: 50 SOLUTION OPHTHALMIC at 09:08

## 2024-08-22 RX ADMIN — ONDANSETRON HYDROCHLORIDE 4 MG: 2 INJECTION, SOLUTION INTRAVENOUS at 09:08

## 2024-08-22 RX ADMIN — SODIUM CHLORIDE, SODIUM GLUCONATE, SODIUM ACETATE, POTASSIUM CHLORIDE AND MAGNESIUM CHLORIDE: 526; 502; 368; 37; 30 INJECTION, SOLUTION INTRAVENOUS at 09:08

## 2024-08-22 RX ADMIN — SODIUM CHLORIDE, POTASSIUM CHLORIDE, SODIUM LACTATE AND CALCIUM CHLORIDE 1000 ML: 600; 310; 30; 20 INJECTION, SOLUTION INTRAVENOUS at 08:08

## 2024-08-22 RX ADMIN — ALBUTEROL SULFATE 2.5 MG: 2.5 SOLUTION RESPIRATORY (INHALATION) at 09:08

## 2024-08-22 RX ADMIN — LEVETIRACETAM 1000 MG: 500 TABLET, FILM COATED ORAL at 09:08

## 2024-08-22 RX ADMIN — SODIUM CHLORIDE 1000 ML: 9 INJECTION, SOLUTION INTRAVENOUS at 07:08

## 2024-08-22 RX ADMIN — METHYLPREDNISOLONE SODIUM SUCCINATE 125 MG: 125 INJECTION, POWDER, FOR SOLUTION INTRAMUSCULAR; INTRAVENOUS at 11:08

## 2024-08-22 RX ADMIN — ONDANSETRON 4 MG: 2 INJECTION INTRAMUSCULAR; INTRAVENOUS at 09:08

## 2024-08-22 RX ADMIN — PIPERACILLIN AND TAZOBACTAM 4.5 G: 4; .5 INJECTION, POWDER, LYOPHILIZED, FOR SOLUTION INTRAVENOUS; PARENTERAL at 08:08

## 2024-08-22 RX ADMIN — MEROPENEM 1 G: 1 INJECTION, POWDER, FOR SOLUTION INTRAVENOUS at 01:08

## 2024-08-22 RX ADMIN — ACETAMINOPHEN 650 MG: 325 TABLET ORAL at 07:08

## 2024-08-22 RX ADMIN — LINEZOLID 600 MG: 600 INJECTION, SOLUTION INTRAVENOUS at 01:08

## 2024-08-22 RX ADMIN — ERGOCALCIFEROL 50000 UNITS: 1.25 CAPSULE, LIQUID FILLED ORAL at 01:08

## 2024-08-22 RX ADMIN — METHOCARBAMOL TABLETS 750 MG: 750 TABLET, COATED ORAL at 09:08

## 2024-08-22 RX ADMIN — IPRATROPIUM BROMIDE AND ALBUTEROL SULFATE 6 ML: .5; 3 SOLUTION RESPIRATORY (INHALATION) at 07:08

## 2024-08-22 NOTE — ED NOTES
Called and spoke to Tanna at [facility] and informed her of patient's current disposition.

## 2024-08-22 NOTE — HPI
History of diabetes, hypertension, CVA with left hemiplegia, prostate cancer with urostomy.  Sent from the nursing home with dyspnea and labored breathing.  Surgery consult for possible small-bowel obstruction.  Patient provides a poor history and denies abdominal pain, nausea, or vomiting.  Reports having a bowel movement yesterday.  However, nursing reports the patient vomited prior to arrival and vomited again on the CT table.    CT shows small bowel distention, most notable throughout the duodenum with associated distention and tympany on exam.  Questionable mesenteric swirl in the inferior abdomen.  Hard to identify area of transition, but he does have decompressed small bowel loops distally with a decompressed colon.  No history of abdominal surgery with the exception of prostate cancer and a urostomy.    Presented with leukocytosis, lactic acidosis, elevated troponin, and elevated BNP.  On BiPAP at time of exam.  Will place NG, initially to full suction to empty gastric contents to decrease risk of aspiration with the BiPAP, then to low intermittent wall suction.  Initial plan is to manage conservatively with NG decompression, follow-up KUB and re-evaluate.

## 2024-08-22 NOTE — CONSULTS
[facility] - Emergency Department  General Surgery  Consult Note    Patient Name: James Calero  MRN: [id number]  Code Status: Full Code  Admission Date: 8/22/2024  Hospital Length of Stay: 0 days  Attending Physician: Moe Sanon MD  Primary Care Provider: Giovanni Wu IV, MD    Patient information was obtained from patient, ER records, and primary team.     Inpatient consult to General Surgery  Consult performed by: Joon Hoffman FNP  Consult ordered by: Dana Hayes MD        Subjective:     Principal Problem: <principal problem not specified>    History of Present Illness: History of diabetes, hypertension, CVA with left hemiplegia, prostate cancer with urostomy.  Sent from the nursing home with dyspnea and labored breathing.  Surgery consult for possible small-bowel obstruction.  Patient provides a poor history and denies abdominal pain, nausea, or vomiting.  Reports having a bowel movement yesterday.  However, nursing reports the patient vomited prior to arrival and vomited again on the CT table.    CT shows small bowel distention, most notable throughout the duodenum with associated distention and tympany on exam.  Questionable mesenteric swirl in the inferior abdomen.  Hard to identify area of transition, but he does have decompressed small bowel loops distally with a decompressed colon.  No history of abdominal surgery with the exception of prostate cancer and a urostomy.    Presented with leukocytosis, lactic acidosis, elevated troponin, and elevated BNP.  On BiPAP at time of exam.  Will place NG, initially to full suction to empty gastric contents to decrease risk of aspiration with the BiPAP, then to low intermittent wall suction.  Initial plan is to manage conservatively with NG decompression, follow-up KUB and re-evaluate.    No current facility-administered medications on file prior to encounter.     Current Outpatient Medications on File Prior to Encounter   Medication Sig     latanoprost 0.005 % ophthalmic solution Place 1 drop into both eyes every evening.    methocarbamoL (ROBAXIN) 750 MG Tab Take 750 mg by mouth 3 (three) times daily.    aspirin-acetaminophen-caffeine 250-250-65 mg (EXCEDRIN MIGRAINE) 250-250-65 mg per tablet Take 1 tablet by mouth once daily.    ergocalciferol (VITAMIN D2) 50,000 unit Cap Take 50,000 Units by mouth every 7 days.    levETIRAcetam (KEPPRA) 1000 MG tablet Take 1,000 mg by mouth 2 (two) times daily.    mirtazapine (REMERON) 7.5 MG Tab Take 7.5 mg by mouth every evening.    multivitamin (THERAGRAN) per tablet Take 1 tablet by mouth once daily.    oxyCODONE (OXY-IR) 5 mg Cap Take 5 mg by mouth every 4 (four) hours as needed for Pain.    rosuvastatin (CRESTOR) 40 MG Tab Take 40 mg by mouth once daily.    senna-docusate 8.6-50 mg (SENNA WITH DOCUSATE SODIUM) 8.6-50 mg per tablet Take 1 tablet by mouth once daily.    vit A/vit C/vit E/zinc/copper (PRESERVISION AREDS ORAL) Take 1 tablet by mouth 2 (two) times a day.       Review of patient's allergies indicates:   Allergen Reactions    Sulfadiazine (bulk)        Past Medical History:   Diagnosis Date    Diabetes mellitus     GERD (gastroesophageal reflux disease)     History of UTI 01/18/2023    No urine cultures available    Hypercholesterolemia     Hypertension     Malignant neoplasm of prostate     Seizures     Stroke      Past Surgical History:   Procedure Laterality Date    INSERTION OF SUPRAPUBIC CATHETER       Family History    None       Tobacco Use    Smoking status: Never    Smokeless tobacco: Never   Substance and Sexual Activity    Alcohol use: Never    Drug use: Never    Sexual activity: Not on file     Review of Systems   Constitutional:  Negative for fever.   Respiratory:  Positive for shortness of breath.    Cardiovascular:  Negative for chest pain.   Gastrointestinal:  Positive for abdominal distention and vomiting.   Neurological:  Positive for weakness (Left hemiplegia).     Objective:      Vital Signs (Most Recent):  Temp: 99 °F (37.2 °C) (08/22/24 0951)  Pulse: (!) 112 (08/22/24 1303)  Resp: (!) 26 (08/22/24 1303)  BP: (!) 144/95 (08/22/24 1303)  SpO2: 95 % (08/22/24 1303) Vital Signs (24h Range):  Temp:  [99 °F (37.2 °C)-101 °F (38.3 °C)] 99 °F (37.2 °C)  Pulse:  [105-136] 112  Resp:  [26-38] 26  SpO2:  [90 %-98 %] 95 %  BP: ()/(61-95) 144/95     Weight: 90.7 kg (200 lb)  Body mass index is 27.89 kg/m².     Physical Exam  Vitals reviewed.   Constitutional:       Appearance: He is ill-appearing.   Cardiovascular:      Rate and Rhythm: Tachycardia present.   Pulmonary:      Effort: Respiratory distress (labored breathing with accessory muscle use, on BiPAP) present.   Abdominal:      General: There is distension.      Tenderness: There is no abdominal tenderness.      Comments: Tympany   Skin:     General: Skin is warm and dry.   Neurological:      Mental Status: He is alert. Mental status is at baseline.      Comments: Left hemiplegia            I have reviewed all pertinent lab results within the past 24 hours.    Significant Diagnostics:  I have reviewed all pertinent imaging results/findings within the past 24 hours.    Assessment/Plan:     No notes have been filed under this hospital service.  Service: General Surgery    VTE Risk Mitigation (From admission, onward)           Ordered     IP VTE LOW RISK PATIENT  Once         08/22/24 1212                    Thank you for your consult. I will follow-up with patient. Please contact us if you have any additional questions.    Joon Hoffman, SARAH  General Surgery  [facility] - Emergency Department

## 2024-08-22 NOTE — SUBJECTIVE & OBJECTIVE
No current facility-administered medications on file prior to encounter.     Current Outpatient Medications on File Prior to Encounter   Medication Sig    latanoprost 0.005 % ophthalmic solution Place 1 drop into both eyes every evening.    methocarbamoL (ROBAXIN) 750 MG Tab Take 750 mg by mouth 3 (three) times daily.    aspirin-acetaminophen-caffeine 250-250-65 mg (EXCEDRIN MIGRAINE) 250-250-65 mg per tablet Take 1 tablet by mouth once daily.    ergocalciferol (VITAMIN D2) 50,000 unit Cap Take 50,000 Units by mouth every 7 days.    levETIRAcetam (KEPPRA) 1000 MG tablet Take 1,000 mg by mouth 2 (two) times daily.    mirtazapine (REMERON) 7.5 MG Tab Take 7.5 mg by mouth every evening.    multivitamin (THERAGRAN) per tablet Take 1 tablet by mouth once daily.    oxyCODONE (OXY-IR) 5 mg Cap Take 5 mg by mouth every 4 (four) hours as needed for Pain.    rosuvastatin (CRESTOR) 40 MG Tab Take 40 mg by mouth once daily.    senna-docusate 8.6-50 mg (SENNA WITH DOCUSATE SODIUM) 8.6-50 mg per tablet Take 1 tablet by mouth once daily.    vit A/vit C/vit E/zinc/copper (PRESERVISION AREDS ORAL) Take 1 tablet by mouth 2 (two) times a day.       Review of patient's allergies indicates:   Allergen Reactions    Sulfadiazine (bulk)        Past Medical History:   Diagnosis Date    Diabetes mellitus     GERD (gastroesophageal reflux disease)     History of UTI 01/18/2023    No urine cultures available    Hypercholesterolemia     Hypertension     Malignant neoplasm of prostate     Seizures     Stroke      Past Surgical History:   Procedure Laterality Date    INSERTION OF SUPRAPUBIC CATHETER       Family History    None       Tobacco Use    Smoking status: Never    Smokeless tobacco: Never   Substance and Sexual Activity    Alcohol use: Never    Drug use: Never    Sexual activity: Not on file     Review of Systems   Constitutional:  Negative for fever.   Respiratory:  Positive for shortness of breath.    Cardiovascular:  Negative for  chest pain.   Gastrointestinal:  Positive for abdominal distention and vomiting.   Neurological:  Positive for weakness (Left hemiplegia).     Objective:     Vital Signs (Most Recent):  Temp: 99 °F (37.2 °C) (08/22/24 0951)  Pulse: (!) 112 (08/22/24 1303)  Resp: (!) 26 (08/22/24 1303)  BP: (!) 144/95 (08/22/24 1303)  SpO2: 95 % (08/22/24 1303) Vital Signs (24h Range):  Temp:  [99 °F (37.2 °C)-101 °F (38.3 °C)] 99 °F (37.2 °C)  Pulse:  [105-136] 112  Resp:  [26-38] 26  SpO2:  [90 %-98 %] 95 %  BP: ()/(61-95) 144/95     Weight: 90.7 kg (200 lb)  Body mass index is 27.89 kg/m².     Physical Exam  Vitals reviewed.   Constitutional:       Appearance: He is ill-appearing.   Cardiovascular:      Rate and Rhythm: Tachycardia present.   Pulmonary:      Effort: Respiratory distress (labored breathing with accessory muscle use, on BiPAP) present.   Abdominal:      General: There is distension.      Tenderness: There is no abdominal tenderness.      Comments: Tympany   Skin:     General: Skin is warm and dry.   Neurological:      Mental Status: He is alert. Mental status is at baseline.      Comments: Left hemiplegia            I have reviewed all pertinent lab results within the past 24 hours.    Significant Diagnostics:  I have reviewed all pertinent imaging results/findings within the past 24 hours.

## 2024-08-22 NOTE — ED PROVIDER NOTES
Encounter Date: 8/22/2024       History     Chief Complaint   Patient presents with    Shortness of Breath     Ems from home - c/o sob and n/v    Vomiting     67 Y/O MALE NAUSEA AND SHORTNESS OF BREATH.  WHEEZING.        Review of patient's allergies indicates:   Allergen Reactions    Sulfadiazine (bulk)      Past Medical History:   Diagnosis Date    Diabetes mellitus     GERD (gastroesophageal reflux disease)     History of UTI 01/18/2023    No urine cultures available    Hypercholesterolemia     Hypertension     Malignant neoplasm of prostate     Seizures     Stroke      Past Surgical History:   Procedure Laterality Date    INSERTION OF SUPRAPUBIC CATHETER       No family history on file.  Social History     Tobacco Use    Smoking status: Never    Smokeless tobacco: Never   Substance Use Topics    Alcohol use: Never    Drug use: Never     Review of Systems    Physical Exam     Initial Vitals   BP Pulse Resp Temp SpO2   -- -- -- -- --      MAP       --         Physical Exam    Medical Screening Exam   See Full Note    ED Course   Procedures  Labs Reviewed   CBC W/ AUTO DIFFERENTIAL    Narrative:     The following orders were created for panel order CBC auto differential.  Procedure                               Abnormality         Status                     ---------                               -----------         ------                     CBC with Differential[[id number]]                                                        Please view results for these tests on the individual orders.   COMPREHENSIVE METABOLIC PANEL   NT-PRO NATRIURETIC PEPTIDE   LACTIC ACID, PLASMA   MAGNESIUM   TROPONIN I   URINALYSIS, REFLEX TO URINE CULTURE   PROTIME-INR   APTT   CBC WITH DIFFERENTIAL          Imaging Results    None          Medications   ondansetron injection 4 mg (has no administration in time range)     Medical Decision Making  Amount and/or Complexity of Data Reviewed  Labs: ordered.  Radiology: ordered.    Risk  OTC  drugs.  Prescription drug management.                                      Clinical Impression:   Final diagnoses:  [R11.0] Nausea

## 2024-08-22 NOTE — PLAN OF CARE
Problem: Adult Inpatient Plan of Care  Goal: Plan of Care Review  Outcome: Progressing  Goal: Patient-Specific Goal (Individualized)  Outcome: Progressing  Goal: Absence of Hospital-Acquired Illness or Injury  Outcome: Progressing  Goal: Optimal Comfort and Wellbeing  Outcome: Progressing  Goal: Readiness for Transition of Care  Outcome: Progressing     Problem: Pneumonia  Goal: Fluid Balance  Outcome: Progressing  Goal: Resolution of Infection Signs and Symptoms  Outcome: Progressing  Goal: Effective Oxygenation and Ventilation  Outcome: Progressing     Problem: Skin Injury Risk Increased  Goal: Skin Health and Integrity  Outcome: Progressing

## 2024-08-22 NOTE — PHARMACY MED REC
"Admission Medication History     The home medication history was taken by Natalya Paulson.    You may go to "Admission" then "Reconcile Home Medications" tabs to review and/or act upon these items.     The home medication list has been updated by the Pharmacy department.   Please read ALL comments highlighted in yellow.   Please address this information as you see fit.    Feel free to contact us if you have any questions or require assistance.  Medications Added:  Preservision AREDs  Methocarbamol 750 mg  Latanoprost 0.005% eye drops  Excedrin Migraine  Multivitamin      Medications listed below were obtained from: [facility] and Nursing home  (Not in a hospital admission)        Current Outpatient Medications on File Prior to Encounter   Medication Sig Dispense Refill Last Dose    aspirin-acetaminophen-caffeine 250-250-65 mg (EXCEDRIN MIGRAINE) 250-250-65 mg per tablet Take 1 tablet by mouth once daily.   8/21/2024    latanoprost 0.005 % ophthalmic solution Place 1 drop into both eyes every evening.   8/21/2024    levETIRAcetam (KEPPRA) 1000 MG tablet Take 1,000 mg by mouth 2 (two) times daily.   8/21/2024    methocarbamoL (ROBAXIN) 750 MG Tab Take 750 mg by mouth 3 (three) times daily.   8/21/2024    mirtazapine (REMERON) 7.5 MG Tab Take 7.5 mg by mouth every evening.   8/21/2024    multivitamin (THERAGRAN) per tablet Take 1 tablet by mouth once daily.   8/21/2024    oxyCODONE (OXY-IR) 5 mg Cap Take 5 mg by mouth every 4 (four) hours as needed for Pain.   8/21/2024    rosuvastatin (CRESTOR) 40 MG Tab Take 40 mg by mouth every evening.   8/21/2024    senna-docusate 8.6-50 mg (SENNA WITH DOCUSATE SODIUM) 8.6-50 mg per tablet Take 1 tablet by mouth once daily.   8/21/2024    vit A/vit C/vit E/zinc/copper (PRESERVISION AREDS ORAL) Take 1 tablet by mouth 2 (two) times a day.   8/21/2024    ergocalciferol (VITAMIN D2) 50,000 unit Cap Take 50,000 Units by mouth every 7 days.   8/18/2024         Potential " issues to be addressed PRIOR TO DISCHARGE  No issues identified.    Natalya Paulson  Pharmacy [facility] Specialist - Medication History  [phone]    .

## 2024-08-23 PROBLEM — R78.81 BACTEREMIA DUE TO PROTEUS SPECIES: Status: ACTIVE | Noted: 2024-08-23

## 2024-08-23 PROBLEM — B96.4 BACTEREMIA DUE TO PROTEUS SPECIES: Status: ACTIVE | Noted: 2024-08-23

## 2024-08-23 LAB
ALBUMIN SERPL BCP-MCNC: 2.9 G/DL (ref 3.5–5)
ALBUMIN/GLOB SERPL: 0.7 {RATIO}
ALP SERPL-CCNC: 54 U/L (ref 45–115)
ALT SERPL W P-5'-P-CCNC: 39 U/L (ref 16–61)
ANION GAP SERPL CALCULATED.3IONS-SCNC: 7 MMOL/L (ref 7–16)
AORTIC ROOT ANNULUS: 2.78 CM
AORTIC VALVE CUSP SEPERATION: 2.01 CM
APICAL FOUR CHAMBER EJECTION FRACTION: 44 %
AST SERPL W P-5'-P-CCNC: 38 U/L (ref 15–37)
AV INDEX (PROSTH): 0.68
AV MEAN GRADIENT: 12 MMHG
AV PEAK GRADIENT: 21 MMHG
AV VALVE AREA BY VELOCITY RATIO: 2.26 CM²
AV VALVE AREA: 2.23 CM²
AV VELOCITY RATIO: 0.69
BASOPHILS # BLD AUTO: 0.12 K/UL (ref 0–0.2)
BASOPHILS NFR BLD AUTO: 0.6 % (ref 0–1)
BILIRUB SERPL-MCNC: 0.6 MG/DL (ref ?–1.2)
BUN SERPL-MCNC: 27 MG/DL (ref 7–18)
BUN/CREAT SERPL: 27 (ref 6–20)
CALCIUM SERPL-MCNC: 8.5 MG/DL (ref 8.5–10.1)
CHLORIDE SERPL-SCNC: 110 MMOL/L (ref 98–107)
CK SERPL-CCNC: 765 U/L (ref 39–308)
CO2 SERPL-SCNC: 27 MMOL/L (ref 21–32)
CREAT SERPL-MCNC: 1.01 MG/DL (ref 0.7–1.3)
CV ECHO LV RWT: 0.68 CM
DIFFERENTIAL METHOD BLD: ABNORMAL
DOP CALC AO PEAK VEL: 2.27 M/S
DOP CALC AO VTI: 40.2 CM
DOP CALC LVOT AREA: 3.3 CM2
DOP CALC LVOT DIAMETER: 2.04 CM
DOP CALC LVOT PEAK VEL: 1.57 M/S
DOP CALC LVOT STROKE VOLUME: 89.84 CM3
DOP CALC MV VTI: 40 CM
DOP CALCLVOT PEAK VEL VTI: 27.5 CM
E WAVE DECELERATION TIME: 232.2 MSEC
E/A RATIO: 1.08
E/E' RATIO: 17.73 M/S
ECHO LV POSTERIOR WALL: 1.54 CM (ref 0.6–1.1)
EGFR (NO RACE VARIABLE) (RUSH/TITUS): 82 ML/MIN/1.73M2
EJECTION FRACTION: 60 %
EOSINOPHIL # BLD AUTO: 0 K/UL (ref 0–0.5)
EOSINOPHIL NFR BLD AUTO: 0 % (ref 1–4)
ERYTHROCYTE [DISTWIDTH] IN BLOOD BY AUTOMATED COUNT: 15.1 % (ref 11.5–14.5)
FRACTIONAL SHORTENING: 24 % (ref 28–44)
GLOBULIN SER-MCNC: 3.9 G/DL (ref 2–4)
GLUCOSE SERPL-MCNC: 167 MG/DL (ref 74–106)
GLUCOSE SERPL-MCNC: 72 MG/DL (ref 70–105)
GLUCOSE SERPL-MCNC: 75 MG/DL (ref 70–105)
HCT VFR BLD AUTO: 42.2 % (ref 40–54)
HGB BLD-MCNC: 13.2 G/DL (ref 13.5–18)
IMM GRANULOCYTES # BLD AUTO: 0.16 K/UL (ref 0–0.04)
IMM GRANULOCYTES NFR BLD: 0.9 % (ref 0–0.4)
INTERVENTRICULAR SEPTUM: 1.34 CM (ref 0.6–1.1)
IVC DIAMETER: 2.04 CM
LACTATE SERPL-SCNC: 2 MMOL/L (ref 0.4–2)
LACTATE SERPL-SCNC: 2.6 MMOL/L (ref 0.4–2)
LEFT ATRIUM AREA SYSTOLIC (APICAL 4 CHAMBER): 15.33 CM2
LEFT ATRIUM SIZE: 3.1 CM
LEFT INTERNAL DIMENSION IN SYSTOLE: 3.47 CM (ref 2.1–4)
LEFT VENTRICLE DIASTOLIC VOLUME: 95.07 ML
LEFT VENTRICLE END DIASTOLIC VOLUME APICAL 4 CHAMBER: 59.38 ML
LEFT VENTRICLE END SYSTOLIC VOLUME APICAL 4 CHAMBER: 36.65 ML
LEFT VENTRICLE SYSTOLIC VOLUME: 49.81 ML
LEFT VENTRICULAR INTERNAL DIMENSION IN DIASTOLE: 4.55 CM (ref 3.5–6)
LEFT VENTRICULAR MASS: 263.49 G
LV LATERAL E/E' RATIO: 19 M/S
LV SEPTAL E/E' RATIO: 16.63 M/S
LVED V (TEICH): 95.07 ML
LVES V (TEICH): 49.81 ML
LVOT MG: 5.79 MMHG
LVOT MV: 1.15 CM/S
LYMPHOCYTES # BLD AUTO: 1 K/UL (ref 1–4.8)
LYMPHOCYTES NFR BLD AUTO: 5.3 % (ref 27–41)
LYMPHOCYTES NFR BLD MANUAL: 5 % (ref 27–41)
MCH RBC QN AUTO: 27.7 PG (ref 27–31)
MCHC RBC AUTO-ENTMCNC: 31.3 G/DL (ref 32–36)
MCV RBC AUTO: 88.5 FL (ref 80–96)
METAMYELOCYTES NFR BLD MANUAL: 2 %
MONOCYTES # BLD AUTO: 0.87 K/UL (ref 0–0.8)
MONOCYTES NFR BLD AUTO: 4.6 % (ref 2–6)
MONOCYTES NFR BLD MANUAL: 6 % (ref 2–6)
MPC BLD CALC-MCNC: 12.8 FL (ref 9.4–12.4)
MV MEAN GRADIENT: 5 MMHG
MV PEAK A VEL: 1.23 M/S
MV PEAK E VEL: 1.33 M/S
MV PEAK GRADIENT: 9 MMHG
MV STENOSIS PRESSURE HALF TIME: 62.74 MS
MV VALVE AREA BY CONTINUITY EQUATION: 2.25 CM2
MV VALVE AREA P 1/2 METHOD: 3.51 CM2
NEUTROPHILS # BLD AUTO: 16.57 K/UL (ref 1.8–7.7)
NEUTROPHILS NFR BLD AUTO: 88.6 % (ref 53–65)
NEUTS BAND NFR BLD MANUAL: 7 % (ref 1–5)
NEUTS SEG NFR BLD MANUAL: 80 % (ref 50–62)
NRBC # BLD AUTO: 0 X10E3/UL
NRBC, AUTO (.00): 0 %
OVALOCYTES BLD QL SMEAR: ABNORMAL
PISA TR MAX VEL: 2.72 M/S
PLATELET # BLD AUTO: 78 K/UL (ref 150–400)
PLATELET MORPHOLOGY: ABNORMAL
POTASSIUM SERPL-SCNC: 4 MMOL/L (ref 3.5–5.1)
PROT SERPL-MCNC: 6.8 G/DL (ref 6.4–8.2)
PV PEAK GRADIENT: 10 MMHG
PV PEAK VELOCITY: 1.56 M/S
RA MAJOR: 3.59 CM
RA PRESSURE ESTIMATED: 15 MMHG
RBC # BLD AUTO: 4.77 M/UL (ref 4.6–6.2)
RIGHT VENTRICLE DIASTOLIC BASEL DIMENSION: 4 CM
RIGHT VENTRICLE DIASTOLIC LENGTH: 6.2 CM
RIGHT VENTRICLE DIASTOLIC MID DIMENSION: 3.1 CM
RIGHT VENTRICULAR LENGTH IN DIASTOLE (APICAL 4-CHAMBER VIEW): 6.16 CM
RV MID DIAMA: 3.05 CM
RV TB RVSP: 18 MMHG
RV WALL THICKNESS: 1.1 CM
SODIUM SERPL-SCNC: 140 MMOL/L (ref 136–145)
TDI LATERAL: 0.07 M/S
TDI SEPTAL: 0.08 M/S
TDI: 0.08 M/S
TR MAX PG: 30 MMHG
TRICUSPID ANNULAR PLANE SYSTOLIC EXCURSION: 2.35 CM
TV REST PULMONARY ARTERY PRESSURE: 45 MMHG
VERIGENE RESULT 2: ABNORMAL
VERIGENE RESULT: ABNORMAL
WBC # BLD AUTO: 18.72 K/UL (ref 4.5–11)

## 2024-08-23 PROCEDURE — 83605 ASSAY OF LACTIC ACID: CPT | Performed by: EMERGENCY MEDICINE

## 2024-08-23 PROCEDURE — 25000242 PHARM REV CODE 250 ALT 637 W/ HCPCS: Performed by: STUDENT IN AN ORGANIZED HEALTH CARE EDUCATION/TRAINING PROGRAM

## 2024-08-23 PROCEDURE — 94640 AIRWAY INHALATION TREATMENT: CPT

## 2024-08-23 PROCEDURE — 36415 COLL VENOUS BLD VENIPUNCTURE: CPT | Performed by: STUDENT IN AN ORGANIZED HEALTH CARE EDUCATION/TRAINING PROGRAM

## 2024-08-23 PROCEDURE — 99291 CRITICAL CARE FIRST HOUR: CPT | Mod: ,,, | Performed by: STUDENT IN AN ORGANIZED HEALTH CARE EDUCATION/TRAINING PROGRAM

## 2024-08-23 PROCEDURE — 25000003 PHARM REV CODE 250: Performed by: STUDENT IN AN ORGANIZED HEALTH CARE EDUCATION/TRAINING PROGRAM

## 2024-08-23 PROCEDURE — 99900035 HC TECH TIME PER 15 MIN (STAT)

## 2024-08-23 PROCEDURE — 80053 COMPREHEN METABOLIC PANEL: CPT | Performed by: STUDENT IN AN ORGANIZED HEALTH CARE EDUCATION/TRAINING PROGRAM

## 2024-08-23 PROCEDURE — 82962 GLUCOSE BLOOD TEST: CPT

## 2024-08-23 PROCEDURE — 36415 COLL VENOUS BLD VENIPUNCTURE: CPT | Performed by: EMERGENCY MEDICINE

## 2024-08-23 PROCEDURE — 63600175 PHARM REV CODE 636 W HCPCS: Performed by: STUDENT IN AN ORGANIZED HEALTH CARE EDUCATION/TRAINING PROGRAM

## 2024-08-23 PROCEDURE — 94761 N-INVAS EAR/PLS OXIMETRY MLT: CPT

## 2024-08-23 PROCEDURE — 20000000 HC ICU ROOM

## 2024-08-23 PROCEDURE — 27000221 HC OXYGEN, UP TO 24 HOURS

## 2024-08-23 PROCEDURE — 87186 SC STD MICRODIL/AGAR DIL: CPT | Performed by: STUDENT IN AN ORGANIZED HEALTH CARE EDUCATION/TRAINING PROGRAM

## 2024-08-23 PROCEDURE — 87086 URINE CULTURE/COLONY COUNT: CPT | Performed by: STUDENT IN AN ORGANIZED HEALTH CARE EDUCATION/TRAINING PROGRAM

## 2024-08-23 PROCEDURE — 82550 ASSAY OF CK (CPK): CPT | Performed by: STUDENT IN AN ORGANIZED HEALTH CARE EDUCATION/TRAINING PROGRAM

## 2024-08-23 PROCEDURE — 85025 COMPLETE CBC W/AUTO DIFF WBC: CPT | Performed by: STUDENT IN AN ORGANIZED HEALTH CARE EDUCATION/TRAINING PROGRAM

## 2024-08-23 RX ORDER — ENOXAPARIN SODIUM 100 MG/ML
40 INJECTION SUBCUTANEOUS EVERY 24 HOURS
Status: DISCONTINUED | OUTPATIENT
Start: 2024-08-24 | End: 2024-08-26 | Stop reason: HOSPADM

## 2024-08-23 RX ORDER — IPRATROPIUM BROMIDE AND ALBUTEROL SULFATE 2.5; .5 MG/3ML; MG/3ML
3 SOLUTION RESPIRATORY (INHALATION)
Status: COMPLETED | OUTPATIENT
Start: 2024-08-23 | End: 2024-08-24

## 2024-08-23 RX ORDER — SODIUM CHLORIDE 9 MG/ML
INJECTION, SOLUTION INTRAVENOUS
Status: DISCONTINUED | OUTPATIENT
Start: 2024-08-23 | End: 2024-08-26 | Stop reason: HOSPADM

## 2024-08-23 RX ORDER — GLUCAGON 1 MG
1 KIT INJECTION
Status: DISCONTINUED | OUTPATIENT
Start: 2024-08-23 | End: 2024-08-26 | Stop reason: HOSPADM

## 2024-08-23 RX ADMIN — IPRATROPIUM BROMIDE AND ALBUTEROL SULFATE 3 ML: .5; 3 SOLUTION RESPIRATORY (INHALATION) at 03:08

## 2024-08-23 RX ADMIN — IPRATROPIUM BROMIDE AND ALBUTEROL SULFATE 3 ML: .5; 3 SOLUTION RESPIRATORY (INHALATION) at 09:08

## 2024-08-23 RX ADMIN — MUPIROCIN: 20 OINTMENT TOPICAL at 08:08

## 2024-08-23 RX ADMIN — LINEZOLID 600 MG: 600 INJECTION, SOLUTION INTRAVENOUS at 01:08

## 2024-08-23 RX ADMIN — MEROPENEM 1 G: 1 INJECTION, POWDER, FOR SOLUTION INTRAVENOUS at 08:08

## 2024-08-23 RX ADMIN — MEROPENEM 1 G: 1 INJECTION, POWDER, FOR SOLUTION INTRAVENOUS at 12:08

## 2024-08-23 RX ADMIN — METHOCARBAMOL TABLETS 750 MG: 750 TABLET, COATED ORAL at 08:08

## 2024-08-23 RX ADMIN — SODIUM CHLORIDE: 9 INJECTION, SOLUTION INTRAVENOUS at 12:08

## 2024-08-23 RX ADMIN — LEVETIRACETAM 1000 MG: 500 TABLET, FILM COATED ORAL at 08:08

## 2024-08-23 RX ADMIN — IPRATROPIUM BROMIDE AND ALBUTEROL SULFATE 3 ML: .5; 3 SOLUTION RESPIRATORY (INHALATION) at 07:08

## 2024-08-23 RX ADMIN — METHOCARBAMOL TABLETS 750 MG: 750 TABLET, COATED ORAL at 03:08

## 2024-08-23 RX ADMIN — THERA TABS 1 TABLET: TAB at 08:08

## 2024-08-23 RX ADMIN — LATANOPROST 1 DROP: 50 SOLUTION OPHTHALMIC at 08:08

## 2024-08-23 RX ADMIN — MEROPENEM 1 G: 1 INJECTION, POWDER, FOR SOLUTION INTRAVENOUS at 04:08

## 2024-08-23 NOTE — PROGRESS NOTES
[facility]  Wound Care    Patient Name:  James Calero   MRN:  [id number]  Date: 8/23/2024  Diagnosis: <principal problem not specified>    History:     Past Medical History:   Diagnosis Date    Diabetes mellitus     GERD (gastroesophageal reflux disease)     History of UTI 01/18/2023    No urine cultures available    Hypercholesterolemia     Hypertension     Malignant neoplasm of prostate     Seizures     Stroke        Social History     Socioeconomic History    Marital status:    Tobacco Use    Smoking status: Never    Smokeless tobacco: Never   Substance and Sexual Activity    Alcohol use: Never    Drug use: Never     Social Determinants of Health     Physical Activity: Inactive (10/12/2023)    Exercise Vital Sign     Days of Exercise per Week: 0 days     Minutes of Exercise per Session: 0 min       Precautions:     Allergies as of 08/22/2024 - Reviewed 08/22/2024   Allergen Reaction Noted    Sulfadiazine (bulk)  10/11/2023       WOC Assessment Details/Treatment     Narrative: Seen patient for initial preventative skin care measures.  Foam borders noted to bilateral heels and sacral.  No redness or open wound noted.  Consult wound care of any findings.    08/23/2024

## 2024-08-23 NOTE — PROGRESS NOTES
Patient Name: James Calero  MRN: [id number]  Admission Date: 8/22/2024  Hospital Length of Stay: 0 days  Code Status: Full Code  Attending Physician: Dana Hayes MD   Primary Care Provider: Giovanni Wu IV, MD     HPI:  67 yo M with CVA w/ residual L hemiparesis, prostate cancer, s/p suprapubic catheter, hx of ESBL E Coli bacteremia, MRSA history and DIIM who presented from his facility with complaints of nausea and shortness of breath with this service consulted for evaluation with plan for admission to ICU for severe sepsis management.      At time of ED presentation, patient was febrile at 101, tachycardic 136 and BP 94/61 with SpO2 90% on RA. He received 2L NC and 1L LR and received Zosyn IV. He was placed on BPAP for concern of acute respiratory failure. Work up included ABG 7.29/41/71/19.7/92%, LA 5.7, Tn elevated at 2963, NTproBNP 9731, SCr 1.57 with no electrolyte abnormalities. Imaging included CT A&P that was concerning for partial SBO vs enteritis.  On my assessment, he denies any acute complaints and denies abdominal pain, N/V or shortness of breath while on BPAP.   ---------------------------------------------------------------------  I received a phone call from ICU nurse Renée about Mr. Carbone.  She stated that the patient has a 16 Icelandic suprapubic catheter indwelling that is not draining.  Urine is flowing around the catheter and not through to the drainage bag.  Renée said that Dr. Hayes asked if we would check the suprapubic catheter and change it if need be.    On physical exam the patient has a 16 Icelandic suprapubic catheter.  Patient is unaware of what doctor inserted his original suprapubic tube and when it was inserted..  He says this particular catheter has been in approximally 2 months.  I tried to manually irrigate the suprapubic tube but could only get sterile water but nothing would return.  I deflated the catheter balloon that had approximately 5 mL of water/saline. I removed  the 16 Brazilian suprapubic tube.  I established a sterile field and prepped the suprapubic entry site with Betadine swabs x3.  I inserted a new 18 Brazilian silicone (Gonzalez) suprapubic tube.  Upon insertion I obtained approximally 100 mL of blood-tinged urine.  I manually irrigated the new suprapubic catheter with 1 L of sterile water and was able to remove several small to medium size blood clots.  Evidently the catheter had stopped up therefore the patient had overdistention of his bladder probably causing the hematuria and urine leaking around the previous suprapubic catheter.  Urine at this time, after the manual bladder irrigation, is a very light pink tinged.    Urine was collected and sent to lab for urine culture.    I will notify Dr. Malloy of the successful suprapubic catheter exchange and bladder irrigation.    Julio Jerome, [facility] Hospital[facility]ERRICK  [facility]  -------------------------------------------------------------------------------------------------------------------------  Note below is copy of the information obtained from the old electronic medical record system, plus the note from 10/11/2023 when he was last hospitalized here.  Patient was supposed to have his follow-up by Urology in [place].  I am not sure if he has had any additional follow-up there or not, but it is thought that is where the suprapubic tube was inserted  History of Present Illness  History of Present Illness  Mr. Calero is 59 years old. He was sent to me by Dr. Vega Milian because of an elevated PSA of 5.9 on June 19, 2014. He originally had an appointment for consultation on September 25 but apparently did not keep that appointment. He has apparently not had another PSA since that time. He has had some slowing of his urinary stream but generally voids reasonably well.. Patient says he has high blood pressure but takes no medication for this and patient is not aware of any previous PSA values nor other  problems such as  stones, UTIs, hematuria, etc.  [February 4, 2015]     Patient still had an elevated PSA on repeat in February. Mr. Calero missed an appointment last month. Returns today for transrectal ultrasound of prostate with biopsies. No known family history of prostate cancer.  [April 2, 2015]     Patient has carcinoma of prostate. He has an appointment to see Dr. Jackson next week about possibly proceeding with robotic prostatectomy. He came in today because he was worried after passing blood in his ejaculate on 2 occasions since the biopsies. He has had no fever or anything  suggesting major problems other than the hemospermia.  [April 24, 2015]     Mr. Calero was sent to Dr. Jackson after he was diagnosed with prostate cancer. He had a robotic prostatectomy done June 24, 2015. He did reasonably well from surgery but since that time patient has had a stroke. He had CVA in January and has not been the same since. He is getting physical therapy 2 days weekly. He had his PSA today and it is immeasurable so clinically doing okay in that regard. No recent urinary tract infection problems; he is in for follow-up of prostate cancer. Patient's only complaint is that he can't have sex. [August 4, 2016]     Mr. Calero is in for the first time in one year as he missed his six-month appointment. Patient in with his nephew who is also his caregiver. Patient basically stable. Main problem seems to be related to the previous stroke. Incontinence does not seem to be any worse. [September 19, 2017]     Mr. Calero was seen in clinic today for the first time in nearly 2 years. He is having to stay in a nursing home now. Patient was seen when he was in [facility] in June. He was found to have a bladder diverticulum and Gonzalez catheter was left indwelling as we did not feel it would be logical for him to have surgery for a bladder diverticulum. We thought he probably had a lymphocele from the CT scan; it turned out the problem  was actually a bladder diverticulum. Gross hematuria felt to be related to that because he cannot empty. I brought him back for us to change the catheter the first time as I suspected they would have trouble changing it at a nursing home since he is unable to get square in the bed because of his contractures. As it turned out that the nurses at the nursing home had already changed his catheter several days ago when catheter got stopped up so this is not likely to be a major issue. Patient in with his wife today but was brought in by non-emergency transport. [July 30, 2019]        PSA Results:  Past PSA Results     PSA was <0.010 on September 19, 2017  and 8/4/2016  PSA was 5.870 on February 4, 2015  PSA was 5.9 on June 19, 2014       The above notes are from the old electronic medical record system.  Patient did have original biopsy by me but had his robotic prostatectomy done by Dr. Brett Jackson in 2015.  The above is the pathology report of Dr. Chambers.  ---------------------------------------------------------------------------------------------------------------------------------------------------------------------------------------------------     The above notes are from the old electronic medical record.  Patient has not seen me since 2019.  Subsequently had a suprapubic tube inserted in [place] presumably at [facility].  He is seeing another urologist there but I am not sure who that is and his wife is not sure.     Patient has been bleeding from his urethra.  He does not have a prostate so it is not from BPH.  Probably needs cystoscopy to see where he is bleeding from but plan is for him to go back [place].  He has pressure necrosis of the penis, as apparently that was caused when he was wearing an indwelling urethral Gonzalez.  Now he has a 16 Slovenian suprapubic tube.  Probably does need cystoscopy because of the hematuria as apparently it was very impressive yesterday but very minimal bleeding today.   Bladder stone could be the source of the problem.  I will be happy to do his cysto here if desired or if they wish to go back to [place] to continue follow-up there that will also be fine.  I will discuss further with his wife but I did not have this information when I saw him earlier today.  The bleeding has subsided basically compared to yesterday.      Electronically signed by Meliton Malloy Jr., MD at 10/12/2023 10:30 AM

## 2024-08-23 NOTE — SUBJECTIVE & OBJECTIVE
Interval History/Significant Events: no acute complaints this morning, extravasation of Levophed treated overnight, off pressor support, shaking this am with interval resolution with no AMS, bacteremic    Review of Systems  Objective:     Vital Signs (Most Recent):  Temp: 98.3 °F (36.8 °C) (08/23/24 1203)  Pulse: 106 (08/23/24 1315)  Resp: 18 (08/23/24 1315)  BP: (!) 86/59 (08/23/24 1300)  SpO2: 100 % (08/23/24 1315) Vital Signs (24h Range):  Temp:  [98 °F (36.7 °C)-98.9 °F (37.2 °C)] 98.3 °F (36.8 °C)  Pulse:  [] 106  Resp:  [13-33] 18  SpO2:  [80 %-100 %] 100 %  BP: ()/(59-86) 86/59   Weight: 96.1 kg (211 lb 13.8 oz)  Body mass index is 29.55 kg/m².      Intake/Output Summary (Last 24 hours) at 8/23/2024 1331  Last data filed at 8/23/2024 1320  Gross per 24 hour   Intake 1684 ml   Output --   Net 1684 ml          Physical Exam  Vitals (poor hygiene) reviewed.   Constitutional:       General: He is not in acute distress.     Appearance: He is ill-appearing.   HENT:      Head: Normocephalic and atraumatic.      Mouth/Throat:      Mouth: Mucous membranes are dry.      Pharynx: No oropharyngeal exudate or posterior oropharyngeal erythema.   Eyes:      General: No scleral icterus.  Cardiovascular:      Rate and Rhythm: Normal rate and regular rhythm.      Heart sounds: Murmur heard.   Pulmonary:      Effort: No respiratory distress.      Breath sounds: Wheezing present. No rhonchi.      Comments: On PAP, Vte 1000s  Abdominal:      General: There is no distension.      Tenderness: There is no abdominal tenderness. There is no guarding.      Comments: tympanic   Musculoskeletal:      Right lower leg: No edema.      Left lower leg: No edema.   Skin:     General: Skin is warm.      Coloration: Skin is not jaundiced.   Neurological:      Mental Status: He is alert and oriented to person, place, and time.      Comments: L sided hemiparesis with contractures            Vents:  Oxygen Concentration (%): 28  (08/23/24 0924)  Lines/Drains/Airways       Drain  Duration                  NG/OG Tube 08/22/24 1234 Right nostril 1 day         Suprapubic Catheter 08/23/24 1130 18 Fr. <1 day              Peripheral Intravenous Line  Duration                  Peripheral IV - Single Lumen 08/22/24 0727 18 G Right Antecubital 1 day         Peripheral IV - Single Lumen 08/22/24 0912 20 G Distal;Posterior;Right Forearm 1 day                  Significant Labs:    CBC/Anemia Profile:  Recent Labs   Lab 08/22/24  0710 08/22/24  0802 08/22/24  1107 08/23/24  0301   WBC 22.89*  --   --  18.72*   HGB 15.7  --   --  13.2*   HCT 50.5 45 47 42.2   *  --   --  78*   MCV 89.9  --   --  88.5   RDW 14.8*  --   --  15.1*        Chemistries:  Recent Labs   Lab 08/22/24  0710 08/23/24  0301    140   K 3.9 4.0   * 110*   CO2 20* 27   BUN 27* 27*   CREATININE 1.57* 1.01   CALCIUM 9.2 8.5   ALBUMIN 3.6 2.9*   PROT 8.2 6.8   BILITOT 0.5 0.6   ALKPHOS 80 54   ALT 52 39   AST 41* 38*   MG 1.7  --        All pertinent labs within the past 24 hours have been reviewed.    Significant Imaging: I have reviewed all pertinent imaging results/findings within the past 24 hours.

## 2024-08-23 NOTE — ASSESSMENT & PLAN NOTE
Admission CT imaging. Complex bladder anatomy. Surgery consulted on admission with bowel decompression with NGT. Appreciate recommendations.

## 2024-08-23 NOTE — PROGRESS NOTES
[facility]  Critical Care Medicine  Progress Note    Patient Name: James Calero  MRN: [id number]  Admission Date: 8/22/2024  Hospital Length of Stay: 1 days  Code Status: Full Code  Attending Provider: Dana Hayes MD  Primary Care Provider: Giovanni Wu IV, MD   Principal Problem: <principal problem not specified>    Subjective:     HPI:  69 yo M with CVA w/ residual L hemiparesis, prostate cancer, s/p suprapubic catheter, hx of ESBL E Coli bacteremia, MRSA history and DIIM who presented from his facility with complaints of nausea and shortness of breath with this service consulted for evaluation with plan for admission to ICU for severe sepsis management.     At time of ED presentation, patient was febrile at 101, tachycardic 136 and BP 94/61 with SpO2 90% on RA. He received 2L NC and 1L LR and received Zosyn IV. He was placed on BPAP for concern of acute respiratory failure. Work up included ABG 7.29/41/71/19.7/92%, LA 5.7, Tn elevated at 2963, NTproBNP 9731, SCr 1.57 with no electrolyte abnormalities. Imaging included CT A&P that was concerning for partial SBO vs enteritis.  On my assessment, he denies any acute complaints and denies abdominal pain, N/V or shortness of breath while on BPAP.   Surgery was consulted at time of admission.     Hospital/ICU Course:  08/23: weaned off pressors; 4/4 bacteremia    Interval History/Significant Events: no acute complaints this morning, extravasation of Levophed treated overnight, off pressor support, shaking this am with interval resolution with no AMS, bacteremic    Review of Systems  Objective:     Vital Signs (Most Recent):  Temp: 98.3 °F (36.8 °C) (08/23/24 1203)  Pulse: 106 (08/23/24 1315)  Resp: 18 (08/23/24 1315)  BP: (!) 86/59 (08/23/24 1300)  SpO2: 100 % (08/23/24 1315) Vital Signs (24h Range):  Temp:  [98 °F (36.7 °C)-98.9 °F (37.2 °C)] 98.3 °F (36.8 °C)  Pulse:  [] 106  Resp:  [13-33] 18  SpO2:  [80 %-100 %] 100 %  BP:  ()/(59-86) 86/59   Weight: 96.1 kg (211 lb 13.8 oz)  Body mass index is 29.55 kg/m².      Intake/Output Summary (Last 24 hours) at 8/23/2024 1331  Last data filed at 8/23/2024 1320  Gross per 24 hour   Intake 1684 ml   Output --   Net 1684 ml          Physical Exam  Vitals (poor hygiene) reviewed.   Constitutional:       General: He is not in acute distress.     Appearance: He is ill-appearing.   HENT:      Head: Normocephalic and atraumatic.      Mouth/Throat:      Mouth: Mucous membranes are dry.      Pharynx: No oropharyngeal exudate or posterior oropharyngeal erythema.   Eyes:      General: No scleral icterus.  Cardiovascular:      Rate and Rhythm: Normal rate and regular rhythm.      Heart sounds: Murmur heard.   Pulmonary:      Effort: No respiratory distress.      Breath sounds: Wheezing present. No rhonchi.      Comments: On PAP, Vte 1000s  Abdominal:      General: There is no distension.      Tenderness: There is no abdominal tenderness. There is no guarding.      Comments: tympanic   Musculoskeletal:      Right lower leg: No edema.      Left lower leg: No edema.   Skin:     General: Skin is warm.      Coloration: Skin is not jaundiced.   Neurological:      Mental Status: He is alert and oriented to person, place, and time.      Comments: L sided hemiparesis with contractures            Vents:  Oxygen Concentration (%): 28 (08/23/24 0924)  Lines/Drains/Airways       Drain  Duration                  NG/OG Tube 08/22/24 1234 Right nostril 1 day         Suprapubic Catheter 08/23/24 1130 18 Fr. <1 day              Peripheral Intravenous Line  Duration                  Peripheral IV - Single Lumen 08/22/24 0727 18 G Right Antecubital 1 day         Peripheral IV - Single Lumen 08/22/24 0912 20 G Distal;Posterior;Right Forearm 1 day                  Significant Labs:    CBC/Anemia Profile:  Recent Labs   Lab 08/22/24  0710 08/22/24  0802 08/22/24  1107 08/23/24  0301   WBC 22.89*  --   --  18.72*   HGB 15.7   --   --  13.2*   HCT 50.5 45 47 42.2   *  --   --  78*   MCV 89.9  --   --  88.5   RDW 14.8*  --   --  15.1*        Chemistries:  Recent Labs   Lab 08/22/24  0710 08/23/24  0301    140   K 3.9 4.0   * 110*   CO2 20* 27   BUN 27* 27*   CREATININE 1.57* 1.01   CALCIUM 9.2 8.5   ALBUMIN 3.6 2.9*   PROT 8.2 6.8   BILITOT 0.5 0.6   ALKPHOS 80 54   ALT 52 39   AST 41* 38*   MG 1.7  --        All pertinent labs within the past 24 hours have been reviewed.    Significant Imaging: I have reviewed all pertinent imaging results/findings within the past 24 hours.    ABG  Recent Labs   Lab 08/22/24  1107   PH 7.37   PO2 88   PCO2 35   HCO3 20.2*     Assessment/Plan:     Neuro  History of seizure  - cont home keppra  - holding mirtazapine on admission while on linezolid; will consider reinstatement 08/24    Pulmonary  Respiratory distress  Resolved at time of admission. D/c BPAP. Continue NC supplementation for goal SpO2 >92%. ABG reviewed, primary metabolic decompensation. 08/23 with wheezing.  - start DuoNebs Q4H while awake x2 days  - supplement as needed for goal SpO2 >92%    Pneumonia due to infectious organism  Bibasilar atelectasis vs consolidation. Hx of MRSA. Ongoing concern for metastatic infectious process. Will cover as per Severe sepsis plan.    Cardiac/Vascular  NSTEMI (non-ST elevated myocardial infarction)  EKG reviewed. Tn peaked on first draw. Demand ischemia type 2 NSTEMI in setting of severe sepsis. 08/2024 with LVEF 60%, moderate RV enlargement, RV hypertrophy present, TAPSE 2.35, normal LA size, normal RA size, trace MR, mild TR, IVC 50 mmHg, PASP 45; no evidence of segmental wall motion abnormalities.    ID  Bacteremia due to Proteus species  Bacteremia 2/2 E Coli and Proteus 4/4 Cxs. Ongoing concern for urologic origin. CT A&P with complex  including significant sized bladder diverticulum. Urology consulted to aid with management and recs in this patient. Will plan on repeat Cxs 08/24,  if persistent bacteremia, will need to investigate this diverticulum further.  - stop Linezolid  - cont Meropenem given hx of ESBL   -- Day 1 to be determined with Cx clearance    Severe sepsis  Severe sepsis with respiratory distress in this patient with ESBL bacteremia history. Concern for multiple sources including respiratory,  and abdomina. Zoysn over ED course with IVF x3L with resolution of hypotension.   - Abx as per Bacteremia plan  - repeat BCx   - cleared LA    GI  Partial small bowel obstruction  Admission CT imaging. Complex bladder anatomy. Surgery consulted on admission with bowel decompression with NGT. Appreciate recommendations.  - BM 08/24  - management per surgery  - LA cleared          Critical Care Medicine Daily Checklist:08/23/2024   F: Feeding Bowel decompression with NGT   A: Analgesia   N/A   S: Sedation CAM-ICU negative   N/A           T: Thromboprophylaxis Lower extremity SCD and holding, planned start 08/24   H: HOB > 300 Yes.   U: Stress Ulcer Prophylaxis N/A   G: Glucose Control Within goal (upper limit 140-180 mg/dL).   S: SAT & SBT Coordinated?  N/A   B: Bowel Regimen Yes in last 24hrs.   I: Indwelling Catheter Reviewed Maintain: Suprapubic catheter  Remove: N/A   D: De-escalation of Antimicrobials Yes    Disposition ICU     ADDENDUM: PATIENT WITH NO LEVOPHED EXTRAVASATION AS PREVIOUSLY STATED    Critical Care Time: 50 minutes  Critical secondary to Patient has a condition that poses threat to life and bodily function: Acute Renal Failure and Septic shock      Critical care was time spent personally by me on the following activities: development of treatment plan with patient or surrogate and bedside caregivers, discussions with consultants, evaluation of patient's response to treatment, examination of patient, ordering and performing treatments and interventions, ordering and review of laboratory studies, ordering and review of radiographic studies, pulse oximetry, re-evaluation  of patient's condition. This critical care time did not overlap with that of any other provider or involve time for any procedures.     Dana Hayes MD  Critical Care Medicine  [facility]

## 2024-08-23 NOTE — PLAN OF CARE
Problem: Adult Inpatient Plan of Care  Goal: Plan of Care Review  Outcome: Progressing  Goal: Patient-Specific Goal (Individualized)  Outcome: Progressing  Goal: Absence of Hospital-Acquired Illness or Injury  Outcome: Progressing  Goal: Optimal Comfort and Wellbeing  Outcome: Progressing  Goal: Readiness for Transition of Care  Outcome: Progressing     Problem: Pneumonia  Goal: Fluid Balance  Outcome: Progressing  Goal: Resolution of Infection Signs and Symptoms  Outcome: Progressing  Goal: Effective Oxygenation and Ventilation  Outcome: Progressing     Problem: Skin Injury Risk Increased  Goal: Skin Health and Integrity  Outcome: Progressing     Problem: Sepsis/Septic Shock  Goal: Optimal Coping  Outcome: Progressing  Goal: Absence of Bleeding  Outcome: Progressing  Goal: Blood Glucose Level Within Targeted Range  Outcome: Progressing  Goal: Absence of Infection Signs and Symptoms  Outcome: Progressing  Goal: Optimal Nutrition Intake  Outcome: Progressing

## 2024-08-23 NOTE — ASSESSMENT & PLAN NOTE
Resolved at time of admission. D/c BPAP. Continue NC supplementation for goal SpO2 >92%. ABG reviewed, primary metabolic decompensation. 08/23 with wheezing.  - start DuoNebs Q4H while awake x2 days  - supplement as needed for goal SpO2 >92%

## 2024-08-23 NOTE — ASSESSMENT & PLAN NOTE
Severe sepsis with respiratory distress in this patient with ESBL bacteremia history. Concern for multiple sources including respiratory,  and abdomina. Zoysn over ED course with IVF x3L with resolution of hypotension.   - start linezolid IV and Meropenem IV  - f/u BCx x2  - start plasmalyte gtt  - trend LA to clearance with ongoing concern for partial SBO

## 2024-08-23 NOTE — HPI
67 yo M with CVA w/ residual L hemiparesis, prostate cancer, s/p suprapubic catheter, hx of ESBL E Coli bacteremia, MRSA history and DIIM who presented from his facility with complaints of nausea and shortness of breath with this service consulted for evaluation with plan for admission to ICU for severe sepsis management.     At time of ED presentation, patient was febrile at 101, tachycardic 136 and BP 94/61 with SpO2 90% on RA. He received 2L NC and 1L LR and received Zosyn IV. He was placed on BPAP for concern of acute respiratory failure. Work up included ABG 7.29/41/71/19.7/92%, LA 5.7, Tn elevated at 2963, NTproBNP 9731, SCr 1.57 with no electrolyte abnormalities. Imaging included CT A&P that was concerning for partial SBO vs enteritis.  On my assessment, he denies any acute complaints and denies abdominal pain, N/V or shortness of breath while on BPAP.   Surgery was consulted at time of admission.

## 2024-08-23 NOTE — ASSESSMENT & PLAN NOTE
Acidosis is improving, but still with periodic tachydardia and mild hypotension  Not felt to be due to intraabdominal process, which seems to be improving/resolved

## 2024-08-23 NOTE — ASSESSMENT & PLAN NOTE
EKG reviewed. Tn peaked on first draw. Demand ischemia type 2 NSTEMI in setting of severe sepsis. 08/2024 with LVEF 60%, moderate RV enlargement, RV hypertrophy present, TAPSE 2.35, normal LA size, normal RA size, trace MR, mild TR, IVC 50 mmHg, PASP 45; no evidence of segmental wall motion abnormalities.

## 2024-08-23 NOTE — SUBJECTIVE & OBJECTIVE
Past Medical History:   Diagnosis Date    Diabetes mellitus     GERD (gastroesophageal reflux disease)     History of UTI 01/18/2023    No urine cultures available    Hypercholesterolemia     Hypertension     Malignant neoplasm of prostate     Seizures     Stroke        Past Surgical History:   Procedure Laterality Date    INSERTION OF SUPRAPUBIC CATHETER         Review of patient's allergies indicates:   Allergen Reactions    Sulfadiazine (bulk)        Family History    None       Tobacco Use    Smoking status: Never    Smokeless tobacco: Never   Substance and Sexual Activity    Alcohol use: Never    Drug use: Never    Sexual activity: Not on file      Review of Systems  Objective:     Vital Signs (Most Recent):  Temp: 98 °F (36.7 °C) (08/22/24 1915)  Pulse: (!) 112 (08/22/24 1745)  Resp: (!) 25 (08/22/24 1745)  BP: 130/77 (08/22/24 1730)  SpO2: (!) 94 % (08/22/24 1745) Vital Signs (24h Range):  Temp:  [98 °F (36.7 °C)-101 °F (38.3 °C)] 98 °F (36.7 °C)  Pulse:  [100-142] 112  Resp:  [18-38] 25  SpO2:  [82 %-98 %] 94 %  BP: ()/(61-95) 130/77   Weight: 90.7 kg (200 lb)  Body mass index is 27.89 kg/m².    No intake or output data in the 24 hours ending 08/22/24 2002       Physical Exam  Vitals (poor hygiene) reviewed.   Constitutional:       General: He is not in acute distress.     Appearance: He is ill-appearing.   HENT:      Head: Normocephalic and atraumatic.      Mouth/Throat:      Mouth: Mucous membranes are dry.      Pharynx: No oropharyngeal exudate or posterior oropharyngeal erythema.   Eyes:      General: No scleral icterus.  Cardiovascular:      Rate and Rhythm: Normal rate and regular rhythm.      Heart sounds: Murmur heard.   Pulmonary:      Effort: No respiratory distress.      Breath sounds: Rhonchi present. No wheezing.      Comments: On PAP, Vte 1000s  Abdominal:      General: There is distension.      Tenderness: There is no abdominal tenderness. There is no guarding.      Comments: tympanic    Musculoskeletal:      Right lower leg: Edema present.      Left lower leg: Edema present.   Skin:     General: Skin is warm.      Coloration: Skin is not jaundiced.   Neurological:      Mental Status: He is alert and oriented to person, place, and time.      Comments: L sided hemiparesis with contractures            Vents:  Oxygen Concentration (%): 35 (08/22/24 1501)  Lines/Drains/Airways       Drain  Duration                  Suprapubic Catheter -- days         NG/OG Tube 08/22/24 1234 Right nostril <1 day              Peripheral Intravenous Line  Duration                  Peripheral IV - Single Lumen 08/22/24 0727 18 G Right Antecubital <1 day         Peripheral IV - Single Lumen 08/22/24 0912 20 G Distal;Posterior;Right Forearm <1 day                  Significant Labs:    CBC/Anemia Profile:  Recent Labs   Lab 08/22/24  0710 08/22/24  0802 08/22/24  1107   WBC 22.89*  --   --    HGB 15.7  --   --    HCT 50.5 45 47   *  --   --    MCV 89.9  --   --    RDW 14.8*  --   --         Chemistries:  Recent Labs   Lab 08/22/24  0710      K 3.9   *   CO2 20*   BUN 27*   CREATININE 1.57*   CALCIUM 9.2   ALBUMIN 3.6   PROT 8.2   BILITOT 0.5   ALKPHOS 80   ALT 52   AST 41*   MG 1.7       All pertinent labs within the past 24 hours have been reviewed.    Significant Imaging: I have reviewed all pertinent imaging results/findings within the past 24 hours.

## 2024-08-23 NOTE — ASSESSMENT & PLAN NOTE
Bibasilar atelectasis vs consolidation. Hx of MRSA. Ongoing concern for metastatic infectious process. Will cover as per Severe sepsis plan.

## 2024-08-23 NOTE — ASSESSMENT & PLAN NOTE
- cont home keppra  - holding mirtazapine on admission while on linezolid; will consider reinstatement 08/24

## 2024-08-23 NOTE — ASSESSMENT & PLAN NOTE
Admission CT imaging. Complex bladder anatomy. Surgery consulted on admission with bowel decompression with NGT. Appreciate recommendations.  -  08/24  - management per surgery  - ELIZABETH cormier

## 2024-08-23 NOTE — SUBJECTIVE & OBJECTIVE
Interval History: Multiple BM's; NG with 400-500cc bilious output; Denies abdominal pain    Medications:  Continuous Infusions:  Scheduled Meds:   ergocalciferol  50,000 Units Oral Q7 Days    latanoprost  1 drop Both Eyes QHS    levETIRAcetam  1,000 mg Oral BID    meropenem IV (PEDS and ADULTS)  1 g Intravenous Q8H    methocarbamoL  750 mg Oral TID    multivitamin  1 tablet Oral Daily    mupirocin   Nasal BID     PRN Meds:  Current Facility-Administered Medications:     albuterol-ipratropium, 3 mL, Nebulization, Q6H PRN    ondansetron, 4 mg, Intravenous, Q8H PRN    sodium chloride 0.9%, 10 mL, Intravenous, PRN    sodium chloride 0.9%, 10 mL, Intravenous, PRN     Review of patient's allergies indicates:   Allergen Reactions    Sulfadiazine (bulk)      Objective:     Vital Signs (Most Recent):  Temp: 98 °F (36.7 °C) (08/23/24 0315)  Pulse: (!) 131 (08/23/24 0924)  Resp: (!) 26 (08/23/24 0924)  BP: 97/64 (08/23/24 0900)  SpO2: (!) 80 % (08/23/24 0924) Vital Signs (24h Range):  Temp:  [98 °F (36.7 °C)-98.2 °F (36.8 °C)] 98 °F (36.7 °C)  Pulse:  [] 131  Resp:  [13-30] 26  SpO2:  [80 %-100 %] 80 %  BP: ()/(61-95) 97/64     Weight: 96.1 kg (211 lb 13.8 oz)  Body mass index is 29.55 kg/m².    Intake/Output - Last 3 Shifts         08/21 0700 08/22 0659 08/22 0700 08/23 0659 08/23 0700 08/24 0659    I.V. (mL/kg)  399.4 (4.2) 142.7 (1.5)    NG/GT  10     IV Piggyback  878.7     Total Intake(mL/kg)  1288.1 (13.4) 142.7 (1.5)    Net  +1288.1 +142.7           Stool Occurrence  3 x 1 x             Physical Exam  Cardiovascular:      Rate and Rhythm: Tachycardia present.   Pulmonary:      Effort: Pulmonary effort is normal.   Abdominal:      Palpations: Abdomen is soft.      Tenderness: There is no abdominal tenderness.   Musculoskeletal:         General: Swelling present.   Neurological:      General: No focal deficit present.      Mental Status: He is alert.   Psychiatric:         Mood and Affect: Mood normal.           Significant Labs:  I have reviewed all pertinent lab results within the past 24 hours.  CBC:   Recent Labs   Lab 08/23/24  0301   WBC 18.72*   RBC 4.77   HGB 13.2*   HCT 42.2   PLT 78*   MCV 88.5   MCH 27.7   MCHC 31.3*     BMP:   Recent Labs   Lab 08/22/24  0710 08/23/24  0301   * 167*    140   K 3.9 4.0   * 110*   CO2 20* 27   BUN 27* 27*   CREATININE 1.57* 1.01   CALCIUM 9.2 8.5   MG 1.7  --      CMP:   Recent Labs   Lab 08/23/24  0301   *   CALCIUM 8.5   ALBUMIN 2.9*   PROT 6.8      K 4.0   CO2 27   *   BUN 27*   CREATININE 1.01   ALKPHOS 54   ALT 39   AST 38*   BILITOT 0.6       Significant Diagnostics:  I have reviewed all pertinent imaging results/findings within the past 24 hours.

## 2024-08-23 NOTE — PLAN OF CARE
[facility]  Initial Discharge Assessment       Primary Care Provider: Giovanni Wu IV, MD    Admission Diagnosis: Nausea [R11.0]  Dyspnea [R06.00]  NSTEMI (non-ST elevated myocardial infarction) [I21.4]  Pneumonia due to infectious organism, unspecified laterality, unspecified part of lung [J18.9]    Admission Date: 8/22/2024  Expected Discharge Date:     Transition of Care Barriers: None    Payor: [employer] MANAGED MEDICARE / Plan: [employer] ADVANTAGE / Product Type: Medicare Advantage /     Extended Emergency Contact Information  Primary Emergency Contact: BRIDGET MARCOS  Mobile Phone: [phone]  Relation: Other  Preferred language: English   needed? No  Secondary Emergency Contact: HEMANTDORI  Mobile Phone: [phone]  Relation: Daughter  Preferred language: English   needed? No    Discharge Plan A: Return to nursing home  Discharge Plan B: Return to Nursing Home    No Pharmacies Listed    Initial Assessment (most recent)       Adult Discharge Assessment - 08/23/24 1000          Discharge Assessment    Assessment Type Discharge Planning Assessment     Confirmed/corrected address, phone number and insurance Yes     Confirmed Demographics Correct on Facesheet     Source of Information patient     Communicated ALEX with patient/caregiver Date not available/Unable to determine     People in Home facility resident     Facility Arrived From: [facility]     Do you expect to return to your current living situation? Yes     Prior to hospitilization cognitive status: Unable to Assess     Current cognitive status: Alert/Oriented     Walking or Climbing Stairs Difficulty yes     Walking or Climbing Stairs ambulation difficulty, dependent;stair climbing difficulty, dependent;transferring difficulty, dependent     Dressing/Bathing Difficulty yes     Dressing/Bathing dressing difficulty, dependent     Equipment Currently Used at Home wheelchair     Readmission  within 30 days? No     Patient currently being followed by outpatient case management? No     Do you currently have service(s) that help you manage your care at home? No     Do you take prescription medications? Yes     Do you have prescription coverage? Yes     Do you have any problems affording any of your prescribed medications? No     Is the patient taking medications as prescribed? yes     How do you get to doctors appointments? family or friend will provide     Are you on dialysis? No     Do you take coumadin? No     Discharge Plan A Return to nursing home     Discharge Plan B Return to Nursing Home     DME Needed Upon Discharge  none     Discharge Plan discussed with: Patient     Transition of Care Barriers None        Physical Activity    On average, how many days per week do you engage in moderate to strenuous exercise (like a brisk walk)? 0 days     On average, how many minutes do you engage in exercise at this level? 0 min        Financial Resource Strain    How hard is it for you to pay for the very basics like food, housing, medical care, and heating? Not hard at all        Housing Stability    In the last 12 months, was there a time when you were not able to pay the mortgage or rent on time? No     At any time in the past 12 months, were you homeless or living in a shelter (including now)? No        Transportation Needs    Has the lack of transportation kept you from medical appointments, meetings, work or from getting things needed for daily living? No        Food Insecurity    Within the past 12 months, you worried that your food would run out before you got the money to buy more. Never true     Within the past 12 months, the food you bought just didn't last and you didn't have money to get more. Never true        Stress    Do you feel stress - tense, restless, nervous, or anxious, or unable to sleep at night because your mind is troubled all the time - these days? Not at all        Social Isolation     How often do you feel lonely or isolated from those around you?  Never        Alcohol Use    Q1: How often do you have a drink containing alcohol? Never     Q2: How many drinks containing alcohol do you have on a typical day when you are drinking? Patient does not drink     Q3: How often do you have six or more drinks on one occasion? Never        Utilities    In the past 12 months has the electric, gas, oil, or water company threatened to shut off services in your home? No        Health Literacy    How often do you need to have someone help you when you read instructions, pamphlets, or other written material from your doctor or pharmacy? Never                      Spoke with pt in room. Pt lives at [facility] and Rehab choice obtained to return at [facility]. Reviewed chart, 0 dc needs noted. Will follow consults.

## 2024-08-23 NOTE — PROGRESS NOTES
[facility]  General Surgery  Progress Note    Subjective:     History of Present Illness:  History of diabetes, hypertension, CVA with left hemiplegia, prostate cancer with urostomy.  Sent from the nursing home with dyspnea and labored breathing.  Surgery consult for possible small-bowel obstruction.  Patient provides a poor history and denies abdominal pain, nausea, or vomiting.  Reports having a bowel movement yesterday.  However, nursing reports the patient vomited prior to arrival and vomited again on the CT table.    CT shows small bowel distention, most notable throughout the duodenum with associated distention and tympany on exam.  Questionable mesenteric swirl in the inferior abdomen.  Hard to identify area of transition, but he does have decompressed small bowel loops distally with a decompressed colon.  No history of abdominal surgery with the exception of prostate cancer and a urostomy.    Presented with leukocytosis, lactic acidosis, elevated troponin, and elevated BNP.  On BiPAP at time of exam.  Will place NG, initially to full suction to empty gastric contents to decrease risk of aspiration with the BiPAP, then to low intermittent wall suction.  Initial plan is to manage conservatively with NG decompression, follow-up KUB and re-evaluate.    Post-Op Info:  * No surgery found *         Interval History: Multiple BM's; NG with 400-500cc bilious output; Denies abdominal pain    Medications:  Continuous Infusions:  Scheduled Meds:   ergocalciferol  50,000 Units Oral Q7 Days    latanoprost  1 drop Both Eyes QHS    levETIRAcetam  1,000 mg Oral BID    meropenem IV (PEDS and ADULTS)  1 g Intravenous Q8H    methocarbamoL  750 mg Oral TID    multivitamin  1 tablet Oral Daily    mupirocin   Nasal BID     PRN Meds:  Current Facility-Administered Medications:     albuterol-ipratropium, 3 mL, Nebulization, Q6H PRN    ondansetron, 4 mg, Intravenous, Q8H PRN    sodium chloride 0.9%, 10 mL,  Intravenous, PRN    sodium chloride 0.9%, 10 mL, Intravenous, PRN     Review of patient's allergies indicates:   Allergen Reactions    Sulfadiazine (bulk)      Objective:     Vital Signs (Most Recent):  Temp: 98 °F (36.7 °C) (08/23/24 0315)  Pulse: (!) 131 (08/23/24 0924)  Resp: (!) 26 (08/23/24 0924)  BP: 97/64 (08/23/24 0900)  SpO2: (!) 80 % (08/23/24 0924) Vital Signs (24h Range):  Temp:  [98 °F (36.7 °C)-98.2 °F (36.8 °C)] 98 °F (36.7 °C)  Pulse:  [] 131  Resp:  [13-30] 26  SpO2:  [80 %-100 %] 80 %  BP: ()/(61-95) 97/64     Weight: 96.1 kg (211 lb 13.8 oz)  Body mass index is 29.55 kg/m².    Intake/Output - Last 3 Shifts         08/21 0700 08/22 0659 08/22 0700 08/23 0659 08/23 0700 08/24 0659    I.V. (mL/kg)  399.4 (4.2) 142.7 (1.5)    NG/GT  10     IV Piggyback  878.7     Total Intake(mL/kg)  1288.1 (13.4) 142.7 (1.5)    Net  +1288.1 +142.7           Stool Occurrence  3 x 1 x             Physical Exam  Cardiovascular:      Rate and Rhythm: Tachycardia present.   Pulmonary:      Effort: Pulmonary effort is normal.   Abdominal:      Palpations: Abdomen is soft.      Tenderness: There is no abdominal tenderness.   Musculoskeletal:         General: Swelling present.   Neurological:      General: No focal deficit present.      Mental Status: He is alert.   Psychiatric:         Mood and Affect: Mood normal.          Significant Labs:  I have reviewed all pertinent lab results within the past 24 hours.  CBC:   Recent Labs   Lab 08/23/24  0301   WBC 18.72*   RBC 4.77   HGB 13.2*   HCT 42.2   PLT 78*   MCV 88.5   MCH 27.7   MCHC 31.3*     BMP:   Recent Labs   Lab 08/22/24  0710 08/23/24  0301   * 167*    140   K 3.9 4.0   * 110*   CO2 20* 27   BUN 27* 27*   CREATININE 1.57* 1.01   CALCIUM 9.2 8.5   MG 1.7  --      CMP:   Recent Labs   Lab 08/23/24  0301   *   CALCIUM 8.5   ALBUMIN 2.9*   PROT 6.8      K 4.0   CO2 27   *   BUN 27*   CREATININE 1.01   ALKPHOS 54   ALT  39   AST 38*   BILITOT 0.6       Significant Diagnostics:  I have reviewed all pertinent imaging results/findings within the past 24 hours.  Assessment/Plan:     Severe sepsis  Acidosis is improving, but still with periodic tachydardia and mild hypotension  Not felt to be due to intraabdominal process, which seems to be improving/resolved    Partial small bowel obstruction  Appears to be resolving  Remove ngt and attempt oral feedings        Nawaf Donaldson MD  General Surgery  [facility]

## 2024-08-23 NOTE — PLAN OF CARE
Problem: Adult Inpatient Plan of Care  Goal: Absence of Hospital-Acquired Illness or Injury  Outcome: Progressing  Intervention: Prevent Skin Injury  Flowsheets (Taken 8/23/2024 0348)  Body Position:   turned   right   heels elevated  Skin Protection:   incontinence pads utilized   protective footwear used   silicone foam dressing in place  Device Skin Pressure Protection:   absorbent pad utilized/changed   adhesive use limited   positioning supports utilized   pressure points protected  Intervention: Prevent and Manage VTE (Venous Thromboembolism) Risk  Flowsheets (Taken 8/23/2024 0348)  VTE Prevention/Management:   remove, assess skin, and reapply sequential compression device   ROM (active) performed   ROM (passive) performed  Intervention: Prevent Infection  Flowsheets (Taken 8/23/2024 0348)  Infection Prevention:   hand hygiene promoted   single patient room provided  Goal: Optimal Comfort and Wellbeing  Outcome: Progressing  Intervention: Monitor Pain and Promote Comfort  Flowsheets (Taken 8/23/2024 0348)  Pain Management Interventions:   care clustered   medication offered   position adjusted   pillow support provided

## 2024-08-23 NOTE — ASSESSMENT & PLAN NOTE
EKG reviewed. Tn peaked on first draw. Demand ischemia type 2 NSTEMI in setting of severe sepsis.  - d/c Tn trend; has demonstrated downtrend x2  - obtain TTE

## 2024-08-23 NOTE — ASSESSMENT & PLAN NOTE
Resolved at time of admission. D/c BPAP. Continue NC supplementation for goal SpO2 >92%. ABG reviewed, primary metabolic decompensation.

## 2024-08-23 NOTE — HOSPITAL COURSE
08/23: weaned off pressors; 4/4 bacteremia    Presenting with severe sepsis found to have ESBL E coli and Proteus bacteremia in this patient with chronic suprapubic catheter. Urology consulted on admission with supra-pubic catheter replacement. Pending repeat BCx to determine day 1 of Abx for goal 14 day course.

## 2024-08-23 NOTE — H&P
[facility]  Critical Care Medicine  History & Physical    Patient Name: James Calero  MRN: [id number]  Admission Date: 8/22/2024  Hospital Length of Stay: 0 days  Code Status: Full Code  Attending Physician: Dana Hayes MD   Primary Care Provider: Giovanni Wu IV, MD   Principal Problem: <principal problem not specified>    Subjective:     HPI:  69 yo M with CVA w/ residual L hemiparesis, prostate cancer, s/p suprapubic catheter, hx of ESBL E Coli bacteremia, MRSA history and DIIM who presented from his facility with complaints of nausea and shortness of breath with this service consulted for evaluation with plan for admission to ICU for severe sepsis management.     At time of ED presentation, patient was febrile at 101, tachycardic 136 and BP 94/61 with SpO2 90% on RA. He received 2L NC and 1L LR and received Zosyn IV. He was placed on BPAP for concern of acute respiratory failure. Work up included ABG 7.29/41/71/19.7/92%, LA 5.7, Tn elevated at 2963, NTproBNP 9731, SCr 1.57 with no electrolyte abnormalities. Imaging included CT A&P that was concerning for partial SBO vs enteritis.  On my assessment, he denies any acute complaints and denies abdominal pain, N/V or shortness of breath while on BPAP.   Surgery was consulted at time of admission.     Hospital/ICU Course:  No notes on file     Past Medical History:   Diagnosis Date    Diabetes mellitus     GERD (gastroesophageal reflux disease)     History of UTI 01/18/2023    No urine cultures available    Hypercholesterolemia     Hypertension     Malignant neoplasm of prostate     Seizures     Stroke        Past Surgical History:   Procedure Laterality Date    INSERTION OF SUPRAPUBIC CATHETER         Review of patient's allergies indicates:   Allergen Reactions    Sulfadiazine (bulk)        Family History    None       Tobacco Use    Smoking status: Never    Smokeless tobacco: Never   Substance and Sexual Activity    Alcohol use:  Never    Drug use: Never    Sexual activity: Not on file      Review of Systems  Objective:     Vital Signs (Most Recent):  Temp: 98 °F (36.7 °C) (08/22/24 1915)  Pulse: (!) 112 (08/22/24 1745)  Resp: (!) 25 (08/22/24 1745)  BP: 130/77 (08/22/24 1730)  SpO2: (!) 94 % (08/22/24 1745) Vital Signs (24h Range):  Temp:  [98 °F (36.7 °C)-101 °F (38.3 °C)] 98 °F (36.7 °C)  Pulse:  [100-142] 112  Resp:  [18-38] 25  SpO2:  [82 %-98 %] 94 %  BP: ()/(61-95) 130/77   Weight: 90.7 kg (200 lb)  Body mass index is 27.89 kg/m².    No intake or output data in the 24 hours ending 08/22/24 2002       Physical Exam  Vitals (poor hygiene) reviewed.   Constitutional:       General: He is not in acute distress.     Appearance: He is ill-appearing.   HENT:      Head: Normocephalic and atraumatic.      Mouth/Throat:      Mouth: Mucous membranes are dry.      Pharynx: No oropharyngeal exudate or posterior oropharyngeal erythema.   Eyes:      General: No scleral icterus.  Cardiovascular:      Rate and Rhythm: Normal rate and regular rhythm.      Heart sounds: Murmur heard.   Pulmonary:      Effort: No respiratory distress.      Breath sounds: Rhonchi present. No wheezing.      Comments: On PAP, Vte 1000s  Abdominal:      General: There is distension.      Tenderness: There is no abdominal tenderness. There is no guarding.      Comments: tympanic   Musculoskeletal:      Right lower leg: Edema present.      Left lower leg: Edema present.   Skin:     General: Skin is warm.      Coloration: Skin is not jaundiced.   Neurological:      Mental Status: He is alert and oriented to person, place, and time.      Comments: L sided hemiparesis with contractures            Vents:  Oxygen Concentration (%): 35 (08/22/24 1501)  Lines/Drains/Airways       Drain  Duration                  Suprapubic Catheter -- days         NG/OG Tube 08/22/24 1234 Right nostril <1 day              Peripheral Intravenous Line  Duration                  Peripheral IV -  Single Lumen 08/22/24 0727 18 G Right Antecubital <1 day         Peripheral IV - Single Lumen 08/22/24 0912 20 G Distal;Posterior;Right Forearm <1 day                  Significant Labs:    CBC/Anemia Profile:  Recent Labs   Lab 08/22/24  0710 08/22/24  0802 08/22/24  1107   WBC 22.89*  --   --    HGB 15.7  --   --    HCT 50.5 45 47   *  --   --    MCV 89.9  --   --    RDW 14.8*  --   --         Chemistries:  Recent Labs   Lab 08/22/24  0710      K 3.9   *   CO2 20*   BUN 27*   CREATININE 1.57*   CALCIUM 9.2   ALBUMIN 3.6   PROT 8.2   BILITOT 0.5   ALKPHOS 80   ALT 52   AST 41*   MG 1.7       All pertinent labs within the past 24 hours have been reviewed.    Significant Imaging: I have reviewed all pertinent imaging results/findings within the past 24 hours.  Assessment/Plan:     Neuro  History of seizure  - cont home keppra  - holding mirtazapine while on linezolid    Pulmonary  Respiratory distress  Resolved at time of admission. D/c BPAP. Continue NC supplementation for goal SpO2 >92%. ABG reviewed, primary metabolic decompensation.    Pneumonia due to infectious organism  Bibasilar atelectasis vs consolidation. Hx of MRSA. Ongoing concern for metastatic infectious process. Will cover as per Severe sepsis plan.    Cardiac/Vascular  NSTEMI (non-ST elevated myocardial infarction)  EKG reviewed. Tn peaked on first draw. Demand ischemia type 2 NSTEMI in setting of severe sepsis.  - d/c Tn trend; has demonstrated downtrend x2  - obtain TTE    ID  Severe sepsis  Severe sepsis with respiratory distress in this patient with ESBL bacteremia history. Concern for multiple sources including respiratory,  and abdomina. Zoysn over ED course with IVF x3L with resolution of hypotension.   - start linezolid IV and Meropenem IV  - f/u BCx x2  - start plasmalyte gtt  - trend LA to clearance with ongoing concern for partial SBO      GI  Partial small bowel obstruction  Admission CT imaging. Complex bladder  anatomy. Surgery consulted on admission with bowel decompression with NGT. Appreciate recommendations.         Critical Care Time: 60 minutes  Critical secondary to Patient has a condition that poses threat to life and bodily function: Severe sepsis    Critical care was time spent personally by me on the following activities: development of treatment plan with patient or surrogate and bedside caregivers, discussions with consultants, evaluation of patient's response to treatment, examination of patient, ordering and performing treatments and interventions, ordering and review of laboratory studies, ordering and review of radiographic studies, pulse oximetry, re-evaluation of patient's condition. This critical care time did not overlap with that of any other provider or involve time for any procedures.     Dana Hayes MD  Critical Care Medicine  [facility]

## 2024-08-24 PROBLEM — D69.6 THROMBOCYTOPENIA: Status: ACTIVE | Noted: 2024-08-24

## 2024-08-24 LAB
ALBUMIN SERPL BCP-MCNC: 2.9 G/DL (ref 3.5–5)
ALBUMIN/GLOB SERPL: 0.8 {RATIO}
ALP SERPL-CCNC: 57 U/L (ref 45–115)
ALT SERPL W P-5'-P-CCNC: 34 U/L (ref 16–61)
ANION GAP SERPL CALCULATED.3IONS-SCNC: 5 MMOL/L (ref 7–16)
AST SERPL W P-5'-P-CCNC: 42 U/L (ref 15–37)
BASOPHILS # BLD AUTO: 0.05 K/UL (ref 0–0.2)
BASOPHILS NFR BLD AUTO: 0.3 % (ref 0–1)
BILIRUB SERPL-MCNC: 0.6 MG/DL (ref ?–1.2)
BUN SERPL-MCNC: 30 MG/DL (ref 7–18)
BUN/CREAT SERPL: 33 (ref 6–20)
CALCIUM SERPL-MCNC: 8.5 MG/DL (ref 8.5–10.1)
CHLORIDE SERPL-SCNC: 112 MMOL/L (ref 98–107)
CO2 SERPL-SCNC: 30 MMOL/L (ref 21–32)
CREAT SERPL-MCNC: 0.92 MG/DL (ref 0.7–1.3)
DACRYOCYTES BLD QL SMEAR: ABNORMAL
DIFFERENTIAL METHOD BLD: ABNORMAL
EGFR (NO RACE VARIABLE) (RUSH/TITUS): 92 ML/MIN/1.73M2
EOSINOPHIL # BLD AUTO: 0.01 K/UL (ref 0–0.5)
EOSINOPHIL NFR BLD AUTO: 0.1 % (ref 1–4)
ERYTHROCYTE [DISTWIDTH] IN BLOOD BY AUTOMATED COUNT: 14.9 % (ref 11.5–14.5)
GLOBULIN SER-MCNC: 3.7 G/DL (ref 2–4)
GLUCOSE SERPL-MCNC: 117 MG/DL (ref 70–105)
GLUCOSE SERPL-MCNC: 73 MG/DL (ref 70–105)
GLUCOSE SERPL-MCNC: 74 MG/DL (ref 70–105)
GLUCOSE SERPL-MCNC: 85 MG/DL (ref 70–105)
GLUCOSE SERPL-MCNC: 87 MG/DL (ref 74–106)
GLUCOSE SERPL-MCNC: 90 MG/DL (ref 70–105)
GLUCOSE SERPL-MCNC: 99 MG/DL (ref 70–105)
HCT VFR BLD AUTO: 41 % (ref 40–54)
HGB BLD-MCNC: 12.7 G/DL (ref 13.5–18)
HYPOCHROMIA BLD QL SMEAR: ABNORMAL
IMM GRANULOCYTES # BLD AUTO: 0.07 K/UL (ref 0–0.04)
IMM GRANULOCYTES NFR BLD: 0.5 % (ref 0–0.4)
LYMPHOCYTES # BLD AUTO: 1.33 K/UL (ref 1–4.8)
LYMPHOCYTES NFR BLD AUTO: 9 % (ref 27–41)
LYMPHOCYTES NFR BLD MANUAL: 13 % (ref 27–41)
MCH RBC QN AUTO: 27.6 PG (ref 27–31)
MCHC RBC AUTO-ENTMCNC: 31 G/DL (ref 32–36)
MCV RBC AUTO: 89.1 FL (ref 80–96)
MONOCYTES # BLD AUTO: 0.89 K/UL (ref 0–0.8)
MONOCYTES NFR BLD AUTO: 6 % (ref 2–6)
MONOCYTES NFR BLD MANUAL: 6 % (ref 2–6)
MPC BLD CALC-MCNC: ABNORMAL G/DL
NEUTROPHILS # BLD AUTO: 12.46 K/UL (ref 1.8–7.7)
NEUTROPHILS NFR BLD AUTO: 84.1 % (ref 53–65)
NEUTS SEG NFR BLD MANUAL: 81 % (ref 50–62)
NRBC # BLD AUTO: 0 X10E3/UL
NRBC, AUTO (.00): 0 %
OVALOCYTES BLD QL SMEAR: ABNORMAL
PLATELET # BLD AUTO: 60 K/UL (ref 150–400)
PLATELET MORPHOLOGY: ABNORMAL
POTASSIUM SERPL-SCNC: 3.7 MMOL/L (ref 3.5–5.1)
PROT SERPL-MCNC: 6.6 G/DL (ref 6.4–8.2)
RBC # BLD AUTO: 4.6 M/UL (ref 4.6–6.2)
SODIUM SERPL-SCNC: 143 MMOL/L (ref 136–145)
WBC # BLD AUTO: 14.81 K/UL (ref 4.5–11)

## 2024-08-24 PROCEDURE — 87040 BLOOD CULTURE FOR BACTERIA: CPT | Performed by: STUDENT IN AN ORGANIZED HEALTH CARE EDUCATION/TRAINING PROGRAM

## 2024-08-24 PROCEDURE — 63600175 PHARM REV CODE 636 W HCPCS: Performed by: STUDENT IN AN ORGANIZED HEALTH CARE EDUCATION/TRAINING PROGRAM

## 2024-08-24 PROCEDURE — 36415 COLL VENOUS BLD VENIPUNCTURE: CPT | Performed by: STUDENT IN AN ORGANIZED HEALTH CARE EDUCATION/TRAINING PROGRAM

## 2024-08-24 PROCEDURE — 82962 GLUCOSE BLOOD TEST: CPT

## 2024-08-24 PROCEDURE — 20000000 HC ICU ROOM

## 2024-08-24 PROCEDURE — 99291 CRITICAL CARE FIRST HOUR: CPT | Mod: ,,, | Performed by: STUDENT IN AN ORGANIZED HEALTH CARE EDUCATION/TRAINING PROGRAM

## 2024-08-24 PROCEDURE — 85025 COMPLETE CBC W/AUTO DIFF WBC: CPT | Performed by: STUDENT IN AN ORGANIZED HEALTH CARE EDUCATION/TRAINING PROGRAM

## 2024-08-24 PROCEDURE — 94761 N-INVAS EAR/PLS OXIMETRY MLT: CPT

## 2024-08-24 PROCEDURE — 99900035 HC TECH TIME PER 15 MIN (STAT)

## 2024-08-24 PROCEDURE — 25000003 PHARM REV CODE 250: Performed by: STUDENT IN AN ORGANIZED HEALTH CARE EDUCATION/TRAINING PROGRAM

## 2024-08-24 PROCEDURE — 94640 AIRWAY INHALATION TREATMENT: CPT

## 2024-08-24 PROCEDURE — 27000221 HC OXYGEN, UP TO 24 HOURS

## 2024-08-24 PROCEDURE — 80053 COMPREHEN METABOLIC PANEL: CPT | Performed by: STUDENT IN AN ORGANIZED HEALTH CARE EDUCATION/TRAINING PROGRAM

## 2024-08-24 PROCEDURE — 25000242 PHARM REV CODE 250 ALT 637 W/ HCPCS: Performed by: STUDENT IN AN ORGANIZED HEALTH CARE EDUCATION/TRAINING PROGRAM

## 2024-08-24 RX ADMIN — LATANOPROST 1 DROP: 50 SOLUTION OPHTHALMIC at 08:08

## 2024-08-24 RX ADMIN — IPRATROPIUM BROMIDE AND ALBUTEROL SULFATE 3 ML: .5; 3 SOLUTION RESPIRATORY (INHALATION) at 11:08

## 2024-08-24 RX ADMIN — IPRATROPIUM BROMIDE AND ALBUTEROL SULFATE 3 ML: .5; 3 SOLUTION RESPIRATORY (INHALATION) at 03:08

## 2024-08-24 RX ADMIN — MEROPENEM 1 G: 1 INJECTION, POWDER, FOR SOLUTION INTRAVENOUS at 04:08

## 2024-08-24 RX ADMIN — IPRATROPIUM BROMIDE AND ALBUTEROL SULFATE 3 ML: .5; 3 SOLUTION RESPIRATORY (INHALATION) at 07:08

## 2024-08-24 RX ADMIN — THERA TABS 1 TABLET: TAB at 09:08

## 2024-08-24 RX ADMIN — SODIUM CHLORIDE: 9 INJECTION, SOLUTION INTRAVENOUS at 01:08

## 2024-08-24 RX ADMIN — METHOCARBAMOL TABLETS 750 MG: 750 TABLET, COATED ORAL at 09:08

## 2024-08-24 RX ADMIN — METHOCARBAMOL TABLETS 750 MG: 750 TABLET, COATED ORAL at 08:08

## 2024-08-24 RX ADMIN — MUPIROCIN: 20 OINTMENT TOPICAL at 08:08

## 2024-08-24 RX ADMIN — MUPIROCIN: 20 OINTMENT TOPICAL at 09:08

## 2024-08-24 RX ADMIN — LEVETIRACETAM 1000 MG: 500 TABLET, FILM COATED ORAL at 08:08

## 2024-08-24 RX ADMIN — ENOXAPARIN SODIUM 40 MG: 40 INJECTION SUBCUTANEOUS at 05:08

## 2024-08-24 RX ADMIN — MEROPENEM 1 G: 1 INJECTION, POWDER, FOR SOLUTION INTRAVENOUS at 01:08

## 2024-08-24 RX ADMIN — LEVETIRACETAM 1000 MG: 500 TABLET, FILM COATED ORAL at 09:08

## 2024-08-24 RX ADMIN — MEROPENEM 1 G: 1 INJECTION, POWDER, FOR SOLUTION INTRAVENOUS at 08:08

## 2024-08-24 RX ADMIN — METHOCARBAMOL TABLETS 750 MG: 750 TABLET, COATED ORAL at 03:08

## 2024-08-24 NOTE — ASSESSMENT & PLAN NOTE
Admission CT imaging. Complex bladder anatomy. Surgery consulted on admission with bowel decompression with NGT. Appreciate recommendations.  - BM 08/24; off NGT 08/24 having pulled  - diet ordered, advancing as tolerated  - appreciate Gen surgery recs

## 2024-08-24 NOTE — ASSESSMENT & PLAN NOTE
Severe sepsis with respiratory distress in this patient with ESBL bacteremia history. Concern for multiple sources including respiratory,  and abdomina. Zoysn over ED course with IVF x3L with resolution of hypotension.   - Abx as per Bacteremia plan  - repeat BCx   - cleared LA

## 2024-08-24 NOTE — PROGRESS NOTES
[facility]  Critical Care Medicine  Progress Note    Patient Name: James Calero  MRN: [id number]  Admission Date: 8/22/2024  Hospital Length of Stay: 2 days  Code Status: Full Code  Attending Provider: Dana Hayes MD  Primary Care Provider: Giovanni Wu IV, MD   Principal Problem: Bacteremia due to Proteus species    Subjective:     HPI:  69 yo M with CVA w/ residual L hemiparesis, prostate cancer, s/p suprapubic catheter, hx of ESBL E Coli bacteremia, MRSA history and DIIM who presented from his facility with complaints of nausea and shortness of breath with this service consulted for evaluation with plan for admission to ICU for severe sepsis management.     At time of ED presentation, patient was febrile at 101, tachycardic 136 and BP 94/61 with SpO2 90% on RA. He received 2L NC and 1L LR and received Zosyn IV. He was placed on BPAP for concern of acute respiratory failure. Work up included ABG 7.29/41/71/19.7/92%, LA 5.7, Tn elevated at 2963, NTproBNP 9731, SCr 1.57 with no electrolyte abnormalities. Imaging included CT A&P that was concerning for partial SBO vs enteritis.  On my assessment, he denies any acute complaints and denies abdominal pain, N/V or shortness of breath while on BPAP.   Surgery was consulted at time of admission.     Hospital/ICU Course:  08/23: weaned off pressors; 4/4 bacteremia    Interval History/Significant Events: no acute complaints this morning, soft BP, repeating BCx, pulled NGT overnight    Review of Systems  Objective:     Vital Signs (Most Recent):  Temp: 97.4 °F (36.3 °C) (08/24/24 0314)  Pulse: 83 (08/24/24 0726)  Resp: (!) 22 (08/24/24 0726)  BP: (!) 88/59 (08/24/24 0630)  SpO2: 99 % (08/24/24 0726) Vital Signs (24h Range):  Temp:  [97.4 °F (36.3 °C)-99.4 °F (37.4 °C)] 97.4 °F (36.3 °C)  Pulse:  [] 83  Resp:  [13-33] 22  SpO2:  [80 %-100 %] 99 %  BP: ()/() 88/59   Weight: 98 kg (216 lb 0.8 oz)  Body mass index is 30.13  kg/m².      Intake/Output Summary (Last 24 hours) at 8/24/2024 0806  Last data filed at 8/24/2024 0604  Gross per 24 hour   Intake 973.77 ml   Output 1060 ml   Net -86.23 ml          Physical Exam  Vitals reviewed.   Constitutional:       General: He is not in acute distress.     Appearance: He is not ill-appearing or toxic-appearing.   HENT:      Head: Normocephalic and atraumatic.      Mouth/Throat:      Mouth: Mucous membranes are moist.      Pharynx: No oropharyngeal exudate or posterior oropharyngeal erythema.   Eyes:      General: No scleral icterus.  Cardiovascular:      Rate and Rhythm: Normal rate and regular rhythm.      Heart sounds: Murmur heard.   Pulmonary:      Effort: No respiratory distress.      Breath sounds: Rhonchi present. No wheezing.      Comments: On PAP, Vte 1000s  Abdominal:      General: There is no distension.      Tenderness: There is no abdominal tenderness. There is no guarding.      Comments: tympanic   Musculoskeletal:      Right lower leg: No edema.      Left lower leg: No edema.   Skin:     General: Skin is warm.      Coloration: Skin is not jaundiced.   Neurological:      Mental Status: He is alert. Mental status is at baseline.      Comments: L sided hemiparesis with contractures, oriented to person and place            Vents:  Oxygen Concentration (%): 28 (08/24/24 0726)  Lines/Drains/Airways       Drain  Duration                  Suprapubic Catheter 08/23/24 1130 18 Fr. <1 day              Peripheral Intravenous Line  Duration                  Peripheral IV - Single Lumen 08/22/24 0727 18 G Right Antecubital 2 days         Peripheral IV - Single Lumen 08/22/24 0912 20 G Distal;Posterior;Right Forearm 1 day                  Significant Labs:    CBC/Anemia Profile:  Recent Labs   Lab 08/22/24  1107 08/23/24  0301 08/24/24  0335   WBC  --  18.72* 14.81*   HGB  --  13.2* 12.7*   HCT 47 42.2 41.0   PLT  --  78* 60*   MCV  --  88.5 89.1   RDW  --  15.1* 14.9*         Chemistries:  Recent Labs   Lab 08/23/24  0301 08/24/24  0335    143   K 4.0 3.7   * 112*   CO2 27 30   BUN 27* 30*   CREATININE 1.01 0.92   CALCIUM 8.5 8.5   ALBUMIN 2.9* 2.9*   PROT 6.8 6.6   BILITOT 0.6 0.6   ALKPHOS 54 57   ALT 39 34   AST 38* 42*       All pertinent labs within the past 24 hours have been reviewed.    Significant Imaging: I have reviewed all pertinent imaging results/findings within the past 24 hours.    ABG  Recent Labs   Lab 08/22/24  1107   PH 7.37   PO2 88   PCO2 35   HCO3 20.2*     Assessment/Plan:     Neuro  History of seizure  - cont home keppra  - holding mirtazapine on admission while on linezolid  -- no overnight event, endorsing good sleep, mirtazapine ordered PRN    Pulmonary  Respiratory distress  Resolved at time of admission. D/c BPAP. Continue NC supplementation for goal SpO2 >92%. ABG reviewed, primary metabolic decompensation. 08/23 with wheezing.  - start DuoNebs Q4H while awake x2 days  - supplement as needed for goal SpO2 >92%    Pneumonia due to infectious organism  Bibasilar atelectasis vs consolidation. Hx of MRSA. Ongoing concern for metastatic infectious process. Will cover as per Severe sepsis plan.    Cardiac/Vascular  NSTEMI (non-ST elevated myocardial infarction)  EKG reviewed. Tn peaked on first draw. Demand ischemia type 2 NSTEMI in setting of severe sepsis. 08/2024 with LVEF 60%, moderate RV enlargement, RV hypertrophy present, TAPSE 2.35, normal LA size, normal RA size, trace MR, mild TR, IVC 50 mmHg, PASP 45; no evidence of segmental wall motion abnormalities.    ID  * Bacteremia due to Proteus species  Polymicrobial. Bacteremia 2/2 E Coli and Proteus 4/4 Cxs. Ongoing concern for urologic origin. CT A&P with complex  including significant sized bladder diverticulum. Urology consulted to aid with management and recs in this patient. Will plan on repeat Cxs 08/24, if persistent bacteremia, will need to investigate this diverticulum further.  -  cont Meropenem given hx of ESBL   -- Day 1 to be determined with Cx clearance, repeat BCx 08/24    Severe sepsis  Severe sepsis with respiratory distress in this patient with ESBL bacteremia history. Concern for multiple sources including respiratory,  and abdomina. Zoysn over ED course with IVF x3L with resolution of hypotension.   - Abx as per Bacteremia plan  - repeat BCx   - cleared LA    GI  Partial small bowel obstruction  Admission CT imaging. Complex bladder anatomy. Surgery consulted on admission with bowel decompression with NGT. Appreciate recommendations.  - BM 08/24  - pulled NGT overnight  - management per surgery  - LA cleared    Other  Thrombocytopenia  Chronic issue on review of records. Severe sepsis with bacteremia further exacerbates issue. No bleeding diathesis. VTE ppx held on admission. Stable thrombocytopenia. Will start enoxaparin.   - CBC daily            Critical Care Medicine Daily Checklist:08/24/2024   F: Feeding Bowel decompression with NGT   A: Analgesia   N/A   S: Sedation CAM-ICU negative   N/A           T: Thromboprophylaxis Lower extremity SCD and holding, planned start 08/24   H: HOB > 300 Yes.   U: Stress Ulcer Prophylaxis N/A   G: Glucose Control Within goal (upper limit 140-180 mg/dL).   S: SAT & SBT Coordinated?  N/A   B: Bowel Regimen Yes in last 24hrs.   I: Indwelling Catheter Reviewed Maintain: Suprapubic catheter  Remove: N/A   D: De-escalation of Antimicrobials Yes    Disposition ICU       Critical Care Time: 45 minutes  Critical secondary to Patient has a condition that poses threat to life and bodily function: Acute Renal Failure      Critical care was time spent personally by me on the following activities: development of treatment plan with patient or surrogate and bedside caregivers, discussions with consultants, evaluation of patient's response to treatment, examination of patient, ordering and performing treatments and interventions, ordering and review of  laboratory studies, ordering and review of radiographic studies, pulse oximetry, re-evaluation of patient's condition. This critical care time did not overlap with that of any other provider or involve time for any procedures.     Dana Hayes MD  Critical Care Medicine  [facility]

## 2024-08-24 NOTE — ASSESSMENT & PLAN NOTE
Chronic issue on review of records. Severe sepsis with bacteremia further exacerbates issue. No bleeding diathesis. VTE ppx held on admission. Stable thrombocytopenia. Will start enoxaparin.   - CBC daily  - will hold chemical ppx for Plts <50

## 2024-08-24 NOTE — ASSESSMENT & PLAN NOTE
Bacteremia 2/2 E Coli and Proteus 4/4 Cxs. Ongoing concern for urologic origin. CT A&P with complex  including significant sized bladder diverticulum. Urology consulted to aid with management and recs in this patient. Will plan on repeat Cxs 08/24, if persistent bacteremia, will need to investigate this diverticulum further.  - stop Linezolid  - cont Meropenem given hx of ESBL   -- Day 1 to be determined with Cx clearance

## 2024-08-24 NOTE — PLAN OF CARE
Problem: Adult Inpatient Plan of Care  Goal: Absence of Hospital-Acquired Illness or Injury  Outcome: Progressing  Intervention: Prevent Skin Injury  Flowsheets (Taken 8/24/2024 0138)  Body Position:   turned   left  Skin Protection:   incontinence pads utilized   protective footwear used   pulse oximeter probe site changed   silicone foam dressing in place  Device Skin Pressure Protection:   absorbent pad utilized/changed   adhesive use limited   positioning supports utilized   pressure points protected  Intervention: Prevent and Manage VTE (Venous Thromboembolism) Risk  Flowsheets (Taken 8/24/2024 0138)  VTE Prevention/Management:   ROM (active) performed   ROM (passive) performed  Intervention: Prevent Infection  Flowsheets (Taken 8/24/2024 0138)  Infection Prevention:   hand hygiene promoted   single patient room provided  Goal: Optimal Comfort and Wellbeing  Outcome: Progressing  Intervention: Monitor Pain and Promote Comfort  Flowsheets (Taken 8/24/2024 0138)  Pain Management Interventions:   care clustered   medication offered   pillow support provided   position adjusted   quiet environment facilitated

## 2024-08-24 NOTE — ASSESSMENT & PLAN NOTE
Polymicrobial. Bacteremia 2/2 E Coli ESBL and Proteus 4/4 Cxs. Ongoing concern for urologic origin. CT A&P with complex  including significant sized bladder diverticulum. Urology consulted to aid with management and recs in this patient. Will plan on repeat Cxs 08/24, if persistent bacteremia, will need to investigate this diverticulum further.  - cont Meropenem for ESBL Ecoli bacteremia and Proteus bacteremia   -- Day 1 to be determined with Cx clearance, repeat BCx 08/24, f/u  - plan for 14 day course  - if persistent bacteremia, engage urology for recs given bladder diverticulum

## 2024-08-24 NOTE — PROGRESS NOTES
[facility]  General Surgery  Progress Note    Subjective:     Interval History:  Patient overnight pulled out his NG tube.  No nausea or vomiting.  Continued to have some bowel movements overnight.    Post-Op Info:  * No surgery found *          Medications:  Continuous Infusions:  Scheduled Meds:   albuterol-ipratropium  3 mL Nebulization Q4H WAKE    enoxparin  40 mg Subcutaneous Q24H (prophylaxis, 1700)    ergocalciferol  50,000 Units Oral Q7 Days    latanoprost  1 drop Both Eyes QHS    levETIRAcetam  1,000 mg Oral BID    meropenem IV (PEDS and ADULTS)  1 g Intravenous Q8H    methocarbamoL  750 mg Oral TID    multivitamin  1 tablet Oral Daily    mupirocin   Nasal BID     PRN Meds:  Current Facility-Administered Medications:     0.9% NaCl, , Intravenous, PRN    albuterol-ipratropium, 3 mL, Nebulization, Q6H PRN    dextrose 10%, 12.5 g, Intravenous, PRN    dextrose 10%, 25 g, Intravenous, PRN    glucagon (human recombinant), 1 mg, Intramuscular, PRN    ondansetron, 4 mg, Intravenous, Q8H PRN    sodium chloride 0.9%, 10 mL, Intravenous, PRN    sodium chloride 0.9%, 10 mL, Intravenous, PRN     Objective:     Vital Signs (Most Recent):  Temp: 98.4 °F (36.9 °C) (08/24/24 1104)  Pulse: 75 (08/24/24 1130)  Resp: 19 (08/24/24 1130)  BP: 97/70 (08/24/24 1130)  SpO2: 99 % (08/24/24 1130) Vital Signs (24h Range):  Temp:  [97.4 °F (36.3 °C)-99.4 °F (37.4 °C)] 98.4 °F (36.9 °C)  Pulse:  [] 75  Resp:  [16-25] 19  SpO2:  [92 %-100 %] 99 %  BP: ()/() 97/70       Intake/Output Summary (Last 24 hours) at 8/24/2024 1235  Last data filed at 8/24/2024 1104  Gross per 24 hour   Intake 831.03 ml   Output 1285 ml   Net -453.97 ml       Physical Exam  Constitutional:       General: He is not in acute distress.  HENT:      Head: Normocephalic.   Cardiovascular:      Rate and Rhythm: Normal rate and regular rhythm.      Pulses: Normal pulses.   Pulmonary:      Effort: Pulmonary effort is normal. No  "respiratory distress.      Breath sounds: Normal breath sounds.   Abdominal:      General: Abdomen is flat. There is no distension.      Palpations: Abdomen is soft.      Tenderness: There is no abdominal tenderness.   Musculoskeletal:         General: Normal range of motion.   Skin:     General: Skin is warm.   Neurological:      General: No focal deficit present.      Mental Status: He is oriented to person, place, and time.         Significant Labs:  CBC:   Recent Labs   Lab 08/24/24  0335   WBC 14.81*   RBC 4.60   HGB 12.7*   HCT 41.0   PLT 60*   MCV 89.1   MCH 27.6   MCHC 31.0*     CMP:   Recent Labs   Lab 08/24/24  0335   GLU 87   CALCIUM 8.5   ALBUMIN 2.9*   PROT 6.6      K 3.7   CO2 30   *   BUN 30*   CREATININE 0.92   ALKPHOS 57   ALT 34   AST 42*   BILITOT 0.6     Coagulation: No results for input(s): "PT", "INR", "APTT" in the last 48 hours.    Significant Diagnostics:  None    Assessment/Plan:     Active Diagnoses:    Diagnosis Date Noted POA    PRINCIPAL PROBLEM:  Bacteremia due to Proteus species [R78.81, B96.4] 08/23/2024 Yes    Thrombocytopenia [D69.6] 08/24/2024 Yes    Partial small bowel obstruction [K56.600] 08/22/2024 Yes    NSTEMI (non-ST elevated myocardial infarction) [I21.4] 08/22/2024 Yes    Pneumonia due to infectious organism [J18.9] 08/22/2024 Yes    Severe sepsis [A41.9, R65.20] 08/22/2024 Yes    Respiratory distress [R06.03] 08/22/2024 Yes    History of seizure [Z87.898] 10/11/2023 Yes      Problems Resolved During this Admission:     Start clear liquid diet.  Will advance diet as tolerated.  Continue to monitor.    Xu Fontanez MD  General Surgery  [facility]  "

## 2024-08-24 NOTE — SUBJECTIVE & OBJECTIVE
Interval History/Significant Events: tolerating resumed diet, no acute complaints this morning, stable cough    Review of Systems  Objective:     Vital Signs (Most Recent):  Temp: 97.4 °F (36.3 °C) (08/24/24 0314)  Pulse: 83 (08/24/24 0726)  Resp: (!) 22 (08/24/24 0726)  BP: (!) 88/59 (08/24/24 0630)  SpO2: 99 % (08/24/24 0726) Vital Signs (24h Range):  Temp:  [97.4 °F (36.3 °C)-99.4 °F (37.4 °C)] 97.4 °F (36.3 °C)  Pulse:  [] 83  Resp:  [13-33] 22  SpO2:  [80 %-100 %] 99 %  BP: ()/() 88/59   Weight: 98 kg (216 lb 0.8 oz)  Body mass index is 30.13 kg/m².      Intake/Output Summary (Last 24 hours) at 8/24/2024 0806  Last data filed at 8/24/2024 0604  Gross per 24 hour   Intake 973.77 ml   Output 1060 ml   Net -86.23 ml          Physical Exam  Vitals reviewed.   Constitutional:       General: He is not in acute distress.     Appearance: He is not ill-appearing or toxic-appearing.   HENT:      Head: Normocephalic and atraumatic.      Mouth/Throat:      Mouth: Mucous membranes are moist.      Pharynx: No oropharyngeal exudate or posterior oropharyngeal erythema.   Eyes:      General: No scleral icterus.  Cardiovascular:      Rate and Rhythm: Normal rate and regular rhythm.      Heart sounds: Murmur heard.   Pulmonary:      Effort: No respiratory distress.      Breath sounds: Rhonchi present. No wheezing.   Abdominal:      General: There is no distension.      Tenderness: There is no abdominal tenderness. There is no guarding.   Musculoskeletal:      Right lower leg: No edema.      Left lower leg: No edema.   Skin:     General: Skin is warm.      Coloration: Skin is not jaundiced.   Neurological:      Mental Status: He is alert. Mental status is at baseline.      Comments: L sided hemiparesis with contractures, oriented to person and place            Vents:  Oxygen Concentration (%): 28 (08/24/24 0726)  Lines/Drains/Airways       Drain  Duration                  Suprapubic Catheter 08/23/24 1130 18 Fr.  <1 day              Peripheral Intravenous Line  Duration                  Peripheral IV - Single Lumen 08/22/24 0727 18 G Right Antecubital 2 days         Peripheral IV - Single Lumen 08/22/24 0912 20 G Distal;Posterior;Right Forearm 1 day                  Significant Labs:    CBC/Anemia Profile:  Recent Labs   Lab 08/22/24  1107 08/23/24  0301 08/24/24  0335   WBC  --  18.72* 14.81*   HGB  --  13.2* 12.7*   HCT 47 42.2 41.0   PLT  --  78* 60*   MCV  --  88.5 89.1   RDW  --  15.1* 14.9*        Chemistries:  Recent Labs   Lab 08/23/24  0301 08/24/24  0335    143   K 4.0 3.7   * 112*   CO2 27 30   BUN 27* 30*   CREATININE 1.01 0.92   CALCIUM 8.5 8.5   ALBUMIN 2.9* 2.9*   PROT 6.8 6.6   BILITOT 0.6 0.6   ALKPHOS 54 57   ALT 39 34   AST 38* 42*       All pertinent labs within the past 24 hours have been reviewed.    Significant Imaging: I have reviewed all pertinent imaging results/findings within the past 24 hours.

## 2024-08-25 PROBLEM — A49.8 INFECTION DUE TO ESBL-PRODUCING ESCHERICHIA COLI: Status: ACTIVE | Noted: 2024-08-25

## 2024-08-25 PROBLEM — J40 BRONCHITIS: Status: ACTIVE | Noted: 2024-08-22

## 2024-08-25 PROBLEM — N17.9 AKI (ACUTE KIDNEY INJURY): Status: ACTIVE | Noted: 2024-08-25

## 2024-08-25 PROBLEM — Z16.12 INFECTION DUE TO ESBL-PRODUCING ESCHERICHIA COLI: Status: ACTIVE | Noted: 2024-08-25

## 2024-08-25 LAB
ALBUMIN SERPL BCP-MCNC: 2.7 G/DL (ref 3.5–5)
ALBUMIN/GLOB SERPL: 0.8 {RATIO}
ALP SERPL-CCNC: 51 U/L (ref 45–115)
ALT SERPL W P-5'-P-CCNC: 30 U/L (ref 16–61)
ANION GAP SERPL CALCULATED.3IONS-SCNC: 5 MMOL/L (ref 7–16)
AST SERPL W P-5'-P-CCNC: 29 U/L (ref 15–37)
BACTERIA BLD CULT: ABNORMAL
BASOPHILS # BLD AUTO: 0.03 K/UL (ref 0–0.2)
BASOPHILS NFR BLD AUTO: 0.3 % (ref 0–1)
BILIRUB SERPL-MCNC: 0.5 MG/DL (ref ?–1.2)
BUN SERPL-MCNC: 17 MG/DL (ref 7–18)
BUN/CREAT SERPL: 25 (ref 6–20)
CALCIUM SERPL-MCNC: 8.7 MG/DL (ref 8.5–10.1)
CHLORIDE SERPL-SCNC: 111 MMOL/L (ref 98–107)
CO2 SERPL-SCNC: 31 MMOL/L (ref 21–32)
CREAT SERPL-MCNC: 0.67 MG/DL (ref 0.7–1.3)
DIFFERENTIAL METHOD BLD: ABNORMAL
EGFR (NO RACE VARIABLE) (RUSH/TITUS): 103 ML/MIN/1.73M2
EOSINOPHIL # BLD AUTO: 0.1 K/UL (ref 0–0.5)
EOSINOPHIL NFR BLD AUTO: 1 % (ref 1–4)
ERYTHROCYTE [DISTWIDTH] IN BLOOD BY AUTOMATED COUNT: 15.2 % (ref 11.5–14.5)
GLOBULIN SER-MCNC: 3.6 G/DL (ref 2–4)
GLUCOSE SERPL-MCNC: 102 MG/DL (ref 70–105)
GLUCOSE SERPL-MCNC: 76 MG/DL (ref 70–105)
GLUCOSE SERPL-MCNC: 80 MG/DL (ref 70–105)
GLUCOSE SERPL-MCNC: 87 MG/DL (ref 70–105)
GLUCOSE SERPL-MCNC: 90 MG/DL (ref 70–105)
GLUCOSE SERPL-MCNC: 98 MG/DL (ref 74–106)
GRAM STN SPEC: ABNORMAL
HCT VFR BLD AUTO: 38.5 % (ref 40–54)
HGB BLD-MCNC: 12 G/DL (ref 13.5–18)
IMM GRANULOCYTES # BLD AUTO: 0.1 K/UL (ref 0–0.04)
IMM GRANULOCYTES NFR BLD: 1 % (ref 0–0.4)
LYMPHOCYTES # BLD AUTO: 1.25 K/UL (ref 1–4.8)
LYMPHOCYTES NFR BLD AUTO: 12 % (ref 27–41)
MCH RBC QN AUTO: 27.8 PG (ref 27–31)
MCHC RBC AUTO-ENTMCNC: 31.2 G/DL (ref 32–36)
MCV RBC AUTO: 89.1 FL (ref 80–96)
MONOCYTES # BLD AUTO: 1.02 K/UL (ref 0–0.8)
MONOCYTES NFR BLD AUTO: 9.8 % (ref 2–6)
MPC BLD CALC-MCNC: 12.1 FL (ref 9.4–12.4)
NEUTROPHILS # BLD AUTO: 7.91 K/UL (ref 1.8–7.7)
NEUTROPHILS NFR BLD AUTO: 75.9 % (ref 53–65)
NRBC # BLD AUTO: 0 X10E3/UL
NRBC, AUTO (.00): 0 %
PLATELET # BLD AUTO: 59 K/UL (ref 150–400)
POTASSIUM SERPL-SCNC: 3.4 MMOL/L (ref 3.5–5.1)
PROT SERPL-MCNC: 6.3 G/DL (ref 6.4–8.2)
RBC # BLD AUTO: 4.32 M/UL (ref 4.6–6.2)
SODIUM SERPL-SCNC: 144 MMOL/L (ref 136–145)
UA COMPLETE W REFLEX CULTURE PNL UR: ABNORMAL
WBC # BLD AUTO: 10.41 K/UL (ref 4.5–11)

## 2024-08-25 PROCEDURE — 82962 GLUCOSE BLOOD TEST: CPT

## 2024-08-25 PROCEDURE — 99900035 HC TECH TIME PER 15 MIN (STAT)

## 2024-08-25 PROCEDURE — 25000242 PHARM REV CODE 250 ALT 637 W/ HCPCS: Performed by: STUDENT IN AN ORGANIZED HEALTH CARE EDUCATION/TRAINING PROGRAM

## 2024-08-25 PROCEDURE — 99222 1ST HOSP IP/OBS MODERATE 55: CPT | Mod: ,,, | Performed by: SURGERY

## 2024-08-25 PROCEDURE — 80053 COMPREHEN METABOLIC PANEL: CPT | Performed by: STUDENT IN AN ORGANIZED HEALTH CARE EDUCATION/TRAINING PROGRAM

## 2024-08-25 PROCEDURE — 25000003 PHARM REV CODE 250: Performed by: STUDENT IN AN ORGANIZED HEALTH CARE EDUCATION/TRAINING PROGRAM

## 2024-08-25 PROCEDURE — 99291 CRITICAL CARE FIRST HOUR: CPT | Mod: ,,, | Performed by: STUDENT IN AN ORGANIZED HEALTH CARE EDUCATION/TRAINING PROGRAM

## 2024-08-25 PROCEDURE — 63600175 PHARM REV CODE 636 W HCPCS: Performed by: STUDENT IN AN ORGANIZED HEALTH CARE EDUCATION/TRAINING PROGRAM

## 2024-08-25 PROCEDURE — 85025 COMPLETE CBC W/AUTO DIFF WBC: CPT | Performed by: STUDENT IN AN ORGANIZED HEALTH CARE EDUCATION/TRAINING PROGRAM

## 2024-08-25 PROCEDURE — 94668 MNPJ CHEST WALL SBSQ: CPT

## 2024-08-25 PROCEDURE — 27000221 HC OXYGEN, UP TO 24 HOURS

## 2024-08-25 PROCEDURE — 11000001 HC ACUTE MED/SURG PRIVATE ROOM

## 2024-08-25 PROCEDURE — 94640 AIRWAY INHALATION TREATMENT: CPT

## 2024-08-25 PROCEDURE — 94761 N-INVAS EAR/PLS OXIMETRY MLT: CPT

## 2024-08-25 PROCEDURE — 36415 COLL VENOUS BLD VENIPUNCTURE: CPT | Performed by: STUDENT IN AN ORGANIZED HEALTH CARE EDUCATION/TRAINING PROGRAM

## 2024-08-25 RX ORDER — MIRTAZAPINE 7.5 MG/1
7.5 TABLET, FILM COATED ORAL NIGHTLY
Status: DISCONTINUED | OUTPATIENT
Start: 2024-08-25 | End: 2024-08-26 | Stop reason: HOSPADM

## 2024-08-25 RX ORDER — MIRTAZAPINE 15 MG/1
15 TABLET, ORALLY DISINTEGRATING ORAL NIGHTLY
Status: DISCONTINUED | OUTPATIENT
Start: 2024-08-25 | End: 2024-08-25

## 2024-08-25 RX ORDER — POTASSIUM CHLORIDE 20 MEQ/1
40 TABLET, EXTENDED RELEASE ORAL EVERY 4 HOURS
Status: COMPLETED | OUTPATIENT
Start: 2024-08-25 | End: 2024-08-25

## 2024-08-25 RX ADMIN — MEROPENEM 1 G: 1 INJECTION, POWDER, FOR SOLUTION INTRAVENOUS at 02:08

## 2024-08-25 RX ADMIN — MEROPENEM 1 G: 1 INJECTION, POWDER, FOR SOLUTION INTRAVENOUS at 08:08

## 2024-08-25 RX ADMIN — POTASSIUM CHLORIDE 40 MEQ: 20 TABLET, EXTENDED RELEASE ORAL at 02:08

## 2024-08-25 RX ADMIN — THERA TABS 1 TABLET: TAB at 08:08

## 2024-08-25 RX ADMIN — LEVETIRACETAM 1000 MG: 500 TABLET, FILM COATED ORAL at 08:08

## 2024-08-25 RX ADMIN — IPRATROPIUM BROMIDE AND ALBUTEROL SULFATE 3 ML: .5; 3 SOLUTION RESPIRATORY (INHALATION) at 07:08

## 2024-08-25 RX ADMIN — MEROPENEM 1 G: 1 INJECTION, POWDER, FOR SOLUTION INTRAVENOUS at 05:08

## 2024-08-25 RX ADMIN — METHOCARBAMOL TABLETS 750 MG: 750 TABLET, COATED ORAL at 08:08

## 2024-08-25 RX ADMIN — LATANOPROST 1 DROP: 50 SOLUTION OPHTHALMIC at 08:08

## 2024-08-25 RX ADMIN — METHOCARBAMOL TABLETS 750 MG: 750 TABLET, COATED ORAL at 02:08

## 2024-08-25 RX ADMIN — MUPIROCIN: 20 OINTMENT TOPICAL at 08:08

## 2024-08-25 RX ADMIN — ENOXAPARIN SODIUM 40 MG: 40 INJECTION SUBCUTANEOUS at 05:08

## 2024-08-25 RX ADMIN — MIRTAZAPINE 7.5 MG: 7.5 TABLET, FILM COATED ORAL at 08:08

## 2024-08-25 RX ADMIN — POTASSIUM CHLORIDE 40 MEQ: 20 TABLET, EXTENDED RELEASE ORAL at 09:08

## 2024-08-25 RX ADMIN — IPRATROPIUM BROMIDE AND ALBUTEROL SULFATE 3 ML: .5; 3 SOLUTION RESPIRATORY (INHALATION) at 12:08

## 2024-08-25 RX ADMIN — POTASSIUM CHLORIDE 40 MEQ: 20 TABLET, EXTENDED RELEASE ORAL at 07:08

## 2024-08-25 NOTE — ASSESSMENT & PLAN NOTE
Resolved at time of admission. D/c BPAP. Continue NC supplementation for goal SpO2 >92%. ABG reviewed, primary metabolic decompensation. 08/23 with wheezing managed with shceduled DuoNebs Q4H while awake x2 days.  - cont DuoNebs PRN  - start chest PT TID  - supplement as needed for goal SpO2 >92%

## 2024-08-25 NOTE — PROGRESS NOTES
[facility]  Critical Care Medicine  Progress Note    Patient Name: James Calero  MRN: [id number]  Admission Date: 8/22/2024  Hospital Length of Stay: 3 days  Code Status: Full Code  Attending Provider: Dana Hayes MD  Primary Care Provider: Giovanni Wu IV, MD   Principal Problem: Bacteremia due to Proteus species    Subjective:     HPI:  67 yo M with CVA w/ residual L hemiparesis, prostate cancer, s/p suprapubic catheter, hx of ESBL E Coli bacteremia, MRSA history and DIIM who presented from his facility with complaints of nausea and shortness of breath with this service consulted for evaluation with plan for admission to ICU for severe sepsis management.     At time of ED presentation, patient was febrile at 101, tachycardic 136 and BP 94/61 with SpO2 90% on RA. He received 2L NC and 1L LR and received Zosyn IV. He was placed on BPAP for concern of acute respiratory failure. Work up included ABG 7.29/41/71/19.7/92%, LA 5.7, Tn elevated at 2963, NTproBNP 9731, SCr 1.57 with no electrolyte abnormalities. Imaging included CT A&P that was concerning for partial SBO vs enteritis.  On my assessment, he denies any acute complaints and denies abdominal pain, N/V or shortness of breath while on BPAP.   Surgery was consulted at time of admission.     Hospital/ICU Course:  08/23: weaned off pressors; 4/4 bacteremia    Presenting with severe sepsis found to have ESBL E coli and Proteus bacteremia in this patient with chronic suprapubic catheter. Urology consulted on admission with supra-pubic catheter replacement. Pending repeat BCx to determine day 1 of Abx for goal 14 day course.    Interval History/Significant Events: tolerating resumed diet, no acute complaints this morning, stable cough    Review of Systems  Objective:     Vital Signs (Most Recent):  Temp: 97.4 °F (36.3 °C) (08/24/24 0314)  Pulse: 83 (08/24/24 0726)  Resp: (!) 22 (08/24/24 0726)  BP: (!) 88/59 (08/24/24 0630)  SpO2: 99 %  (08/24/24 0726) Vital Signs (24h Range):  Temp:  [97.4 °F (36.3 °C)-99.4 °F (37.4 °C)] 97.4 °F (36.3 °C)  Pulse:  [] 83  Resp:  [13-33] 22  SpO2:  [80 %-100 %] 99 %  BP: ()/() 88/59   Weight: 98 kg (216 lb 0.8 oz)  Body mass index is 30.13 kg/m².      Intake/Output Summary (Last 24 hours) at 8/24/2024 0806  Last data filed at 8/24/2024 0604  Gross per 24 hour   Intake 973.77 ml   Output 1060 ml   Net -86.23 ml          Physical Exam  Vitals reviewed.   Constitutional:       General: He is not in acute distress.     Appearance: He is not ill-appearing or toxic-appearing.   HENT:      Head: Normocephalic and atraumatic.      Mouth/Throat:      Mouth: Mucous membranes are moist.      Pharynx: No oropharyngeal exudate or posterior oropharyngeal erythema.   Eyes:      General: No scleral icterus.  Cardiovascular:      Rate and Rhythm: Normal rate and regular rhythm.      Heart sounds: Murmur heard.   Pulmonary:      Effort: No respiratory distress.      Breath sounds: Rhonchi present. No wheezing.   Abdominal:      General: There is no distension.      Tenderness: There is no abdominal tenderness. There is no guarding.   Musculoskeletal:      Right lower leg: No edema.      Left lower leg: No edema.   Skin:     General: Skin is warm.      Coloration: Skin is not jaundiced.   Neurological:      Mental Status: He is alert. Mental status is at baseline.      Comments: L sided hemiparesis with contractures, oriented to person and place            Vents:  Oxygen Concentration (%): 28 (08/24/24 0726)  Lines/Drains/Airways       Drain  Duration                  Suprapubic Catheter 08/23/24 1130 18 Fr. <1 day              Peripheral Intravenous Line  Duration                  Peripheral IV - Single Lumen 08/22/24 0727 18 G Right Antecubital 2 days         Peripheral IV - Single Lumen 08/22/24 0912 20 G Distal;Posterior;Right Forearm 1 day                  Significant Labs:    CBC/Anemia Profile:  Recent Labs    Lab 08/22/24  1107 08/23/24  0301 08/24/24  0335   WBC  --  18.72* 14.81*   HGB  --  13.2* 12.7*   HCT 47 42.2 41.0   PLT  --  78* 60*   MCV  --  88.5 89.1   RDW  --  15.1* 14.9*        Chemistries:  Recent Labs   Lab 08/23/24  0301 08/24/24  0335    143   K 4.0 3.7   * 112*   CO2 27 30   BUN 27* 30*   CREATININE 1.01 0.92   CALCIUM 8.5 8.5   ALBUMIN 2.9* 2.9*   PROT 6.8 6.6   BILITOT 0.6 0.6   ALKPHOS 54 57   ALT 39 34   AST 38* 42*       All pertinent labs within the past 24 hours have been reviewed.    Significant Imaging: I have reviewed all pertinent imaging results/findings within the past 24 hours.    ABG  Recent Labs   Lab 08/22/24  1107   PH 7.37   PO2 88   PCO2 35   HCO3 20.2*     Assessment/Plan:     Neuro  History of seizure  - cont home keppra  - resuming home mirtazapine to avoid a withdrawal syndrome    Pulmonary  Bronchitis  Resolved at time of admission. D/c BPAP. Continue NC supplementation for goal SpO2 >92%. ABG reviewed, primary metabolic decompensation. 08/23 with wheezing managed with shceduled DuoNebs Q4H while awake x2 days.  - cont DuoNebs PRN  - start chest PT TID  - supplement as needed for goal SpO2 >92%    Pneumonia due to infectious organism  Bibasilar atelectasis vs consolidation. Hx of MRSA. Ongoing concern for metastatic infectious process. Will cover as per Severe sepsis plan.    Cardiac/Vascular  NSTEMI (non-ST elevated myocardial infarction)  EKG reviewed. Tn peaked on first draw. Demand ischemia type 2 NSTEMI in setting of severe sepsis. 08/2024 with LVEF 60%, moderate RV enlargement, RV hypertrophy present, TAPSE 2.35, normal LA size, normal RA size, trace MR, mild TR, IVC 50 mmHg, PASP 45; no evidence of segmental wall motion abnormalities.    Renal/  THAIS (acute kidney injury)  SCr 1.57 from baseline <1. THAIS in setting of severe sepsis. Pre-renal azotemia 2/2 hypovolemia. Resolved with fluid resuscitation.     ID  * Bacteremia due to Proteus  species  Polymicrobial. Bacteremia 2/2 E Coli ESBL and Proteus 4/4 Cxs. Ongoing concern for urologic origin. CT A&P with complex  including significant sized bladder diverticulum. Urology consulted to aid with management and recs in this patient. Will plan on repeat Cxs 08/24, if persistent bacteremia, will need to investigate this diverticulum further.  - cont Meropenem for ESBL Ecoli bacteremia and Proteus bacteremia   -- Day 1 to be determined with Cx clearance, repeat BCx 08/24, f/u  - plan for 14 day course  - if persistent bacteremia, engage urology for recs given bladder diverticulum    Infection due to ESBL-producing Escherichia coli  Management as per bacteremia plan.     Severe sepsis  Severe sepsis with respiratory distress in this patient with ESBL bacteremia history. Concern for multiple sources including respiratory,  and abdomina. Zoysn over ED course with IVF x3L with resolution of hypotension.   - Abx as per Bacteremia plan  - repeat BCx   - cleared LA    GI  Partial small bowel obstruction  Admission CT imaging. Complex bladder anatomy. Surgery consulted on admission with bowel decompression with NGT. Appreciate recommendations.  - BM 08/24; off NGT 08/24 having pulled  - diet ordered, advancing as tolerated  - appreciate Gen surgery recs    Other  Thrombocytopenia  Chronic issue on review of records. Severe sepsis with bacteremia further exacerbates issue. No bleeding diathesis. VTE ppx held on admission. Stable thrombocytopenia. Will start enoxaparin.   - CBC daily  - will hold chemical ppx for Plts <50            Critical Care Medicine Daily Checklist:08/25/2024   F: Feeding Bowel decompression with NGT   A: Analgesia   N/A   S: Sedation CAM-ICU negative   N/A           T: Thromboprophylaxis Lower extremity SCD and holding, planned start 08/24   H: HOB > 300 Yes.   U: Stress Ulcer Prophylaxis N/A   G: Glucose Control Within goal (upper limit 140-180 mg/dL).   S: SAT & SBT Coordinated?  N/A    B: Bowel Regimen Yes in last 24hrs.   I: Indwelling Catheter Reviewed Maintain: Suprapubic catheter  Remove: N/A   D: De-escalation of Antimicrobials Yes    Disposition ICU       Critical Care Time: 45 minutes  Critical secondary to Patient has a condition that poses threat to life and bodily function: Acute Renal Failure      Critical care was time spent personally by me on the following activities: development of treatment plan with patient or surrogate and bedside caregivers, discussions with consultants, evaluation of patient's response to treatment, examination of patient, ordering and performing treatments and interventions, ordering and review of laboratory studies, ordering and review of radiographic studies, pulse oximetry, re-evaluation of patient's condition. This critical care time did not overlap with that of any other provider or involve time for any procedures.     Dana Hayes MD  Critical Care Medicine  [facility]

## 2024-08-25 NOTE — NURSING
Pt arrived to floor via ICU staff. Transferred to hospital bed x4 staff. Pt able to make need knows. No complaints voiced at present. Oriented to room and call bell system. Pt displayed use of call bell system via return demonstration. Gonzalez noted intact and patent with drainage bag placed below bladder. Call bell within reach. Safety precautions ongoing.

## 2024-08-25 NOTE — PLAN OF CARE
Problem: Adult Inpatient Plan of Care  Goal: Plan of Care Review  Outcome: Progressing  Goal: Patient-Specific Goal (Individualized)  Outcome: Progressing  Goal: Absence of Hospital-Acquired Illness or Injury  Outcome: Progressing  Goal: Optimal Comfort and Wellbeing  Outcome: Progressing  Goal: Readiness for Transition of Care  Outcome: Progressing     Problem: Pneumonia  Goal: Fluid Balance  Outcome: Progressing  Goal: Resolution of Infection Signs and Symptoms  Outcome: Progressing  Goal: Effective Oxygenation and Ventilation  Outcome: Progressing     Problem: Skin Injury Risk Increased  Goal: Skin Health and Integrity  Outcome: Progressing     Problem: Sepsis/Septic Shock  Goal: Optimal Coping  Outcome: Progressing  Goal: Absence of Bleeding  Outcome: Progressing  Goal: Blood Glucose Level Within Targeted Range  Outcome: Progressing  Goal: Absence of Infection Signs and Symptoms  Outcome: Progressing  Goal: Optimal Nutrition Intake  Outcome: Progressing     Problem: Acute Kidney Injury/Impairment  Goal: Fluid and Electrolyte Balance  Outcome: Progressing  Goal: Improved Oral Intake  Outcome: Progressing  Goal: Effective Renal Function  Outcome: Progressing

## 2024-08-25 NOTE — PLAN OF CARE
Problem: Adult Inpatient Plan of Care  Goal: Optimal Comfort and Wellbeing  Outcome: Progressing  Intervention: Provide Person-Centered Care  Flowsheets (Taken 8/24/2024 1944)  Trust Relationship/Rapport:   care explained   choices provided   questions answered   reassurance provided   thoughts/feelings acknowledged   questions encouraged     Problem: Skin Injury Risk Increased  Goal: Skin Health and Integrity  Outcome: Progressing  Intervention: Optimize Skin Protection  Flowsheets (Taken 8/24/2024 1944)  Pressure Reduction Techniques:   weight shift assistance provided   pressure points protected   heels elevated off bed  Pressure Reduction Devices: specialty bed utilized  Skin Protection:   transparent dressing maintained   silicone foam dressing in place

## 2024-08-25 NOTE — PROGRESS NOTES
[facility] - [address] [place] [address]  General Surgery  Progress Note    Subjective:     Interval History:  Patient doing well overall tolerating clear liquid diet no nausea or vomiting.  Positive bowel movement    Post-Op Info:  * No surgery found *          Medications:  Continuous Infusions:  Scheduled Meds:   enoxparin  40 mg Subcutaneous Q24H (prophylaxis, 1700)    ergocalciferol  50,000 Units Oral Q7 Days    latanoprost  1 drop Both Eyes QHS    levETIRAcetam  1,000 mg Oral BID    meropenem IV (PEDS and ADULTS)  1 g Intravenous Q8H    methocarbamoL  750 mg Oral TID    mirtazapine  7.5 mg Oral QHS    multivitamin  1 tablet Oral Daily    mupirocin   Nasal BID    potassium chloride  40 mEq Oral Q4H     PRN Meds:  Current Facility-Administered Medications:     0.9% NaCl, , Intravenous, PRN    albuterol-ipratropium, 3 mL, Nebulization, Q6H PRN    dextrose 10%, 12.5 g, Intravenous, PRN    dextrose 10%, 25 g, Intravenous, PRN    glucagon (human recombinant), 1 mg, Intramuscular, PRN    ondansetron, 4 mg, Intravenous, Q8H PRN    sodium chloride 0.9%, 10 mL, Intravenous, PRN    sodium chloride 0.9%, 10 mL, Intravenous, PRN     Objective:     Vital Signs (Most Recent):  Temp: 97.6 °F (36.4 °C) (08/25/24 0715)  Pulse: 78 (08/25/24 0715)  Resp: (!) 25 (08/25/24 0715)  BP: 109/69 (08/25/24 0715)  SpO2: 100 % (08/25/24 0715) Vital Signs (24h Range):  Temp:  [97.4 °F (36.3 °C)-98.2 °F (36.8 °C)] 97.6 °F (36.4 °C)  Pulse:  [] 78  Resp:  [12-28] 25  SpO2:  [71 %-100 %] 100 %  BP: ()/(52-87) 109/69       Intake/Output Summary (Last 24 hours) at 8/25/2024 1133  Last data filed at 8/25/2024 0607  Gross per 24 hour   Intake 318.44 ml   Output 550 ml   Net -231.56 ml       Physical Exam  Constitutional:       General: He is not in acute distress.  HENT:      Head: Normocephalic.   Cardiovascular:      Rate and Rhythm: Normal rate and regular rhythm.      Pulses: Normal pulses.   Pulmonary:      Effort:  "Pulmonary effort is normal. No respiratory distress.      Breath sounds: Normal breath sounds.   Abdominal:      General: Abdomen is flat. There is no distension.      Palpations: Abdomen is soft.      Tenderness: There is no abdominal tenderness.   Musculoskeletal:         General: Normal range of motion.   Skin:     General: Skin is warm.   Neurological:      General: No focal deficit present.      Mental Status: He is oriented to person, place, and time.         Significant Labs:  Cardiac markers: No results for input(s): "CKMB", "CPKMB", "TROPONINT", "TROPONINI", "MYOGLOBIN" in the last 48 hours.  CBC:   Recent Labs   Lab 08/25/24 0427   WBC 10.41   RBC 4.32*   HGB 12.0*   HCT 38.5*   PLT 59*   MCV 89.1   MCH 27.8   MCHC 31.2*     CMP:   Recent Labs   Lab 08/25/24 0427   GLU 98   CALCIUM 8.7   ALBUMIN 2.7*   PROT 6.3*      K 3.4*   CO2 31   *   BUN 17   CREATININE 0.67*   ALKPHOS 51   ALT 30   AST 29   BILITOT 0.5     Coagulation: No results for input(s): "PT", "INR", "APTT" in the last 48 hours.    Significant Diagnostics:  None    Assessment/Plan:     Active Diagnoses:    Diagnosis Date Noted POA    PRINCIPAL PROBLEM:  Bacteremia due to Proteus species [R78.81, B96.4] 08/23/2024 Yes    Infection due to ESBL-producing Escherichia coli [A49.8, Z16.12] 08/25/2024 Yes    THAIS (acute kidney injury) [N17.9] 08/25/2024 Yes    Thrombocytopenia [D69.6] 08/24/2024 Yes    Partial small bowel obstruction [K56.600] 08/22/2024 Yes    NSTEMI (non-ST elevated myocardial infarction) [I21.4] 08/22/2024 Yes    Pneumonia due to infectious organism [J18.9] 08/22/2024 Yes    Severe sepsis [A41.9, R65.20] 08/22/2024 Yes    Bronchitis [J40] 08/22/2024 Yes    History of seizure [Z87.898] 10/11/2023 Yes      Problems Resolved During this Admission:     Will advance patient's diet to regular food.    Xu Fontanez MD  General Surgery  [facility] - [address] [place] [address]"

## 2024-08-25 NOTE — ASSESSMENT & PLAN NOTE
SCr 1.57 from baseline <1. THAIS in setting of severe sepsis. Pre-renal azotemia 2/2 hypovolemia. Resolved with fluid resuscitation.

## 2024-08-26 VITALS
TEMPERATURE: 99 F | SYSTOLIC BLOOD PRESSURE: 116 MMHG | HEART RATE: 90 BPM | HEIGHT: 71 IN | OXYGEN SATURATION: 97 % | RESPIRATION RATE: 18 BRPM | DIASTOLIC BLOOD PRESSURE: 75 MMHG | WEIGHT: 217.81 LBS | BODY MASS INDEX: 30.49 KG/M2

## 2024-08-26 LAB
ALBUMIN SERPL BCP-MCNC: 2.5 G/DL (ref 3.5–5)
ALBUMIN/GLOB SERPL: 0.8 {RATIO}
ALP SERPL-CCNC: 55 U/L (ref 45–115)
ALT SERPL W P-5'-P-CCNC: 26 U/L (ref 16–61)
ANION GAP SERPL CALCULATED.3IONS-SCNC: 10 MMOL/L (ref 7–16)
ANISOCYTOSIS BLD QL SMEAR: ABNORMAL
AST SERPL W P-5'-P-CCNC: 19 U/L (ref 15–37)
BASOPHILS # BLD AUTO: 0.04 K/UL (ref 0–0.2)
BASOPHILS NFR BLD AUTO: 0.5 % (ref 0–1)
BASOPHILS NFR BLD MANUAL: 1 % (ref 0–1)
BILIRUB SERPL-MCNC: 0.5 MG/DL (ref ?–1.2)
BUN SERPL-MCNC: 15 MG/DL (ref 7–18)
BUN/CREAT SERPL: 24 (ref 6–20)
CALCIUM SERPL-MCNC: 8.1 MG/DL (ref 8.5–10.1)
CHLORIDE SERPL-SCNC: 112 MMOL/L (ref 98–107)
CO2 SERPL-SCNC: 25 MMOL/L (ref 21–32)
CREAT SERPL-MCNC: 0.63 MG/DL (ref 0.7–1.3)
DIFFERENTIAL METHOD BLD: ABNORMAL
EGFR (NO RACE VARIABLE) (RUSH/TITUS): 105 ML/MIN/1.73M2
EOSINOPHIL # BLD AUTO: 0.14 K/UL (ref 0–0.5)
EOSINOPHIL NFR BLD AUTO: 1.7 % (ref 1–4)
EOSINOPHIL NFR BLD MANUAL: 2 % (ref 1–4)
ERYTHROCYTE [DISTWIDTH] IN BLOOD BY AUTOMATED COUNT: 15.2 % (ref 11.5–14.5)
GLOBULIN SER-MCNC: 3.1 G/DL (ref 2–4)
GLUCOSE SERPL-MCNC: 122 MG/DL (ref 70–105)
GLUCOSE SERPL-MCNC: 95 MG/DL (ref 70–105)
GLUCOSE SERPL-MCNC: 95 MG/DL (ref 74–106)
HCT VFR BLD AUTO: 37.8 % (ref 40–54)
HGB BLD-MCNC: 12 G/DL (ref 13.5–18)
IMM GRANULOCYTES # BLD AUTO: 0.28 K/UL (ref 0–0.04)
IMM GRANULOCYTES NFR BLD: 3.5 % (ref 0–0.4)
LYMPHOCYTES # BLD AUTO: 1.95 K/UL (ref 1–4.8)
LYMPHOCYTES NFR BLD AUTO: 24.3 % (ref 27–41)
LYMPHOCYTES NFR BLD MANUAL: 26 % (ref 27–41)
MCH RBC QN AUTO: 28.1 PG (ref 27–31)
MCHC RBC AUTO-ENTMCNC: 31.7 G/DL (ref 32–36)
MCV RBC AUTO: 88.5 FL (ref 80–96)
METAMYELOCYTES NFR BLD MANUAL: 1 %
MONOCYTES # BLD AUTO: 1.12 K/UL (ref 0–0.8)
MONOCYTES NFR BLD AUTO: 14 % (ref 2–6)
MONOCYTES NFR BLD MANUAL: 8 % (ref 2–6)
MPC BLD CALC-MCNC: 12.4 FL (ref 9.4–12.4)
NEUTROPHILS # BLD AUTO: 4.48 K/UL (ref 1.8–7.7)
NEUTROPHILS NFR BLD AUTO: 56 % (ref 53–65)
NEUTS BAND NFR BLD MANUAL: 5 % (ref 1–5)
NEUTS SEG NFR BLD MANUAL: 57 % (ref 50–62)
NRBC # BLD AUTO: 0 X10E3/UL
NRBC BLD MANUAL-RTO: 2 /100 WBC
NRBC, AUTO (.00): 0 %
OVALOCYTES BLD QL SMEAR: ABNORMAL
PLATELET # BLD AUTO: 73 K/UL (ref 150–400)
PLATELET MORPHOLOGY: ABNORMAL
POTASSIUM SERPL-SCNC: 3.7 MMOL/L (ref 3.5–5.1)
PROT SERPL-MCNC: 5.6 G/DL (ref 6.4–8.2)
RBC # BLD AUTO: 4.27 M/UL (ref 4.6–6.2)
SODIUM SERPL-SCNC: 143 MMOL/L (ref 136–145)
WBC # BLD AUTO: 8.01 K/UL (ref 4.5–11)

## 2024-08-26 PROCEDURE — 25000003 PHARM REV CODE 250: Performed by: STUDENT IN AN ORGANIZED HEALTH CARE EDUCATION/TRAINING PROGRAM

## 2024-08-26 PROCEDURE — 82962 GLUCOSE BLOOD TEST: CPT

## 2024-08-26 PROCEDURE — 85025 COMPLETE CBC W/AUTO DIFF WBC: CPT | Performed by: STUDENT IN AN ORGANIZED HEALTH CARE EDUCATION/TRAINING PROGRAM

## 2024-08-26 PROCEDURE — 94761 N-INVAS EAR/PLS OXIMETRY MLT: CPT

## 2024-08-26 PROCEDURE — 63600175 PHARM REV CODE 636 W HCPCS: Performed by: STUDENT IN AN ORGANIZED HEALTH CARE EDUCATION/TRAINING PROGRAM

## 2024-08-26 PROCEDURE — 94668 MNPJ CHEST WALL SBSQ: CPT

## 2024-08-26 PROCEDURE — 02HV33Z INSERTION OF INFUSION DEVICE INTO SUPERIOR VENA CAVA, PERCUTANEOUS APPROACH: ICD-10-PCS | Performed by: RADIOLOGY

## 2024-08-26 PROCEDURE — 25000003 PHARM REV CODE 250: Performed by: INTERNAL MEDICINE

## 2024-08-26 PROCEDURE — 80053 COMPREHEN METABOLIC PANEL: CPT | Performed by: STUDENT IN AN ORGANIZED HEALTH CARE EDUCATION/TRAINING PROGRAM

## 2024-08-26 PROCEDURE — 36415 COLL VENOUS BLD VENIPUNCTURE: CPT | Performed by: STUDENT IN AN ORGANIZED HEALTH CARE EDUCATION/TRAINING PROGRAM

## 2024-08-26 PROCEDURE — 99239 HOSP IP/OBS DSCHRG MGMT >30: CPT | Mod: ,,, | Performed by: STUDENT IN AN ORGANIZED HEALTH CARE EDUCATION/TRAINING PROGRAM

## 2024-08-26 RX ORDER — ACETAMINOPHEN 325 MG/1
650 TABLET ORAL EVERY 6 HOURS PRN
Status: DISCONTINUED | OUTPATIENT
Start: 2024-08-26 | End: 2024-08-26 | Stop reason: HOSPADM

## 2024-08-26 RX ADMIN — MUPIROCIN: 20 OINTMENT TOPICAL at 08:08

## 2024-08-26 RX ADMIN — LEVETIRACETAM 1000 MG: 500 TABLET, FILM COATED ORAL at 08:08

## 2024-08-26 RX ADMIN — ACETAMINOPHEN 650 MG: 325 TABLET ORAL at 01:08

## 2024-08-26 RX ADMIN — THERA TABS 1 TABLET: TAB at 08:08

## 2024-08-26 RX ADMIN — ERTAPENEM 1 G: 1 INJECTION INTRAMUSCULAR; INTRAVENOUS at 02:08

## 2024-08-26 RX ADMIN — ENOXAPARIN SODIUM 40 MG: 40 INJECTION SUBCUTANEOUS at 05:08

## 2024-08-26 RX ADMIN — METHOCARBAMOL TABLETS 750 MG: 750 TABLET, COATED ORAL at 08:08

## 2024-08-26 RX ADMIN — MEROPENEM 1 G: 1 INJECTION, POWDER, FOR SOLUTION INTRAVENOUS at 05:08

## 2024-08-26 RX ADMIN — METHOCARBAMOL TABLETS 750 MG: 750 TABLET, COATED ORAL at 05:08

## 2024-08-26 NOTE — ASSESSMENT & PLAN NOTE
The likely etiology of thrombocytopenia is sepsis. The patients 3 most recent labs are listed below.  Recent Labs     08/24/24  0335 08/25/24  0427 08/26/24  0405   PLT 60* 59* 73*     Plan  - Will transfuse if platelet count is <10k.  - follow

## 2024-08-26 NOTE — PLAN OF CARE
[facility]  [facility] Final Note    Primary Care Provider: Giovanni Wu IV, MD    Expected Discharge Date:     Final Discharge Note (most recent)       Final Note - 08/26/24 1003          Final Note    Assessment Type Final Discharge Note     Anticipated Discharge Disposition detention Nursing Facility        Post-Acute Status    Post-Acute Authorization Placement     Post-Acute Placement Status Set-up Complete/Auth obtained     Patient choice form signed by patient/caregiver List with quality metrics by geographic area provided;List from CMS Compare     Discharge Delays None known at this time                     Important Message from Medicare  Important Message from Medicare regarding Discharge Appeal Rights: Given to patient/caregiver, Explained to patient/caregiver, Signed/date by patient/caregiver     Date IMM was signed: 08/26/24  Time IMM was signed: 1004    After-discharge care                Destination       [facility]   Service: Nursing Home    [address] [place] [address]   Phone: [phone]                     Pt for dc back to [facility] today, confirmed with ashwini at [facility] that they can give iv abx daily, clinicals faxed, will fax dc clinicals once available, packet to [facility]

## 2024-08-26 NOTE — PROGRESS NOTES
[facility] - [facility]  General Surgery  Progress Note    Subjective:     History of Present Illness:  History of diabetes, hypertension, CVA with left hemiplegia, prostate cancer with urostomy.  Sent from the nursing home with dyspnea and labored breathing.  Surgery consult for possible small-bowel obstruction.  Patient provides a poor history and denies abdominal pain, nausea, or vomiting.  Reports having a bowel movement yesterday.  However, nursing reports the patient vomited prior to arrival and vomited again on the CT table.    CT shows small bowel distention, most notable throughout the duodenum with associated distention and tympany on exam.  Questionable mesenteric swirl in the inferior abdomen.  Hard to identify area of transition, but he does have decompressed small bowel loops distally with a decompressed colon.  No history of abdominal surgery with the exception of prostate cancer and a urostomy.    Presented with leukocytosis, lactic acidosis, elevated troponin, and elevated BNP.  On BiPAP at time of exam.  Will place NG, initially to full suction to empty gastric contents to decrease risk of aspiration with the BiPAP, then to low intermittent wall suction.  Initial plan is to manage conservatively with NG decompression, follow-up KUB and re-evaluate.    Post-Op Info:  * No surgery found *         Interval History:   Stable.  Small-bowel obstruction resolved.  Tolerated clear liquid diet without nausea, vomiting, abdominal pain, or distention.  Positive flatus and bowel movements.  Will advance to diabetic diet. If tolerating, can discharge back to [facility] home.    Medications:  Continuous Infusions:  Scheduled Meds:   enoxparin  40 mg Subcutaneous Q24H (prophylaxis, 1700)    ergocalciferol  50,000 Units Oral Q7 Days    ertapenem (INVanz) IV (PEDS and ADULTS)  1 g Intravenous Q24H    latanoprost  1 drop Both Eyes QHS    levETIRAcetam  1,000 mg Oral BID    methocarbamoL  750 mg  Oral TID    mirtazapine  7.5 mg Oral QHS    multivitamin  1 tablet Oral Daily    mupirocin   Nasal BID     PRN Meds:  Current Facility-Administered Medications:     0.9% NaCl, , Intravenous, PRN    acetaminophen, 650 mg, Oral, Q6H PRN    albuterol-ipratropium, 3 mL, Nebulization, Q6H PRN    dextrose 10%, 12.5 g, Intravenous, PRN    dextrose 10%, 25 g, Intravenous, PRN    glucagon (human recombinant), 1 mg, Intramuscular, PRN    ondansetron, 4 mg, Intravenous, Q8H PRN    sodium chloride 0.9%, 10 mL, Intravenous, PRN    sodium chloride 0.9%, 10 mL, Intravenous, PRN     Review of patient's allergies indicates:   Allergen Reactions    Sulfadiazine (bulk)      Objective:     Vital Signs (Most Recent):  Temp: 99.2 °F (37.3 °C) (08/26/24 1103)  Pulse: 90 (08/26/24 1103)  Resp: 18 (08/26/24 1103)  BP: 116/75 (08/26/24 1103)  SpO2: 97 % (08/26/24 1103) Vital Signs (24h Range):  Temp:  [97.7 °F (36.5 °C)-100.9 °F (38.3 °C)] 99.2 °F (37.3 °C)  Pulse:  [77-97] 90  Resp:  [17-19] 18  SpO2:  [91 %-97 %] 97 %  BP: (105-126)/(67-84) 116/75     Weight: 98.8 kg (217 lb 13 oz)  Body mass index is 30.38 kg/m².    Intake/Output - Last 3 Shifts         08/24 0700 08/25 0659 08/25 0700 08/26 0659 08/26 0700 08/27 0659    P.O. 200 120     I.V. (mL/kg) 18.4 (0.2) 6.7 (0.1)     NG/GT       IV Piggyback 100 296.5     Total Intake(mL/kg) 318.4 (3.2) 423.2 (4.3)     Urine (mL/kg/hr) 775 (0.3) 700 (0.3) 500 (1)    Drains       Other       Stool 0 0     Total Output 775 700 500    Net -456.6 -276.8 -500           Stool Occurrence 3 x 1 x              Physical Exam  Vitals reviewed.   Cardiovascular:      Rate and Rhythm: Normal rate.   Pulmonary:      Effort: Pulmonary effort is normal. No respiratory distress.   Abdominal:      General: There is no distension.      Palpations: Abdomen is soft.      Tenderness: There is no abdominal tenderness.   Skin:     General: Skin is warm and dry.   Neurological:      Mental Status: He is alert. Mental  status is at baseline.          Significant Labs:  I have reviewed all pertinent lab results within the past 24 hours.    Significant Diagnostics:  I have reviewed all pertinent imaging results/findings within the past 24 hours.  Assessment/Plan:     Severe sepsis  Acidosis is improving, but still with periodic tachydardia and mild hypotension  Not felt to be due to intraabdominal process, which seems to be improving/resolved    Partial small bowel obstruction  Appears to be resolving  Remove ngt and attempt oral feedings        Joon Hoffman, SARAH  General Surgery  [facility] - [address] [place] [address]

## 2024-08-26 NOTE — PROGRESS NOTES
Patient's suprapubic tube is working well now and urine is essentially clear.  Discussed with Dr. Webb.  Patient will need to continue IV antibiotics for several more days because of positive blood culture.  He does not need to have hematuria workup in my opinion.  Just needs to complete therapy for the septicemia.  Resume previous care of his suprapubic tube as previously.  Gets part of his care here and part in [place].

## 2024-08-26 NOTE — ASSESSMENT & PLAN NOTE
THAIS is likely due to acute tubular necrosis caused by sepsis . Baseline creatinine is  0.6 . Most recent creatinine and eGFR are listed below.  Recent Labs     08/24/24  0335 08/25/24  0427 08/26/24  0405   CREATININE 0.92 0.67* 0.63*   EGFRNORACEVR 92 103 105      Plan  - THAIS is stable  - Avoid nephrotoxins and renally dose meds for GFR listed above  - Monitor urine output, serial BMP, and adjust therapy as needed  - follow

## 2024-08-26 NOTE — SUBJECTIVE & OBJECTIVE
Interval History:   Stable.  Small-bowel obstruction resolved.  Tolerated clear liquid diet without nausea, vomiting, abdominal pain, or distention.  Positive flatus and bowel movements.  Will advance to diabetic diet. If tolerating, can discharge back to [facility] home.    Medications:  Continuous Infusions:  Scheduled Meds:   enoxparin  40 mg Subcutaneous Q24H (prophylaxis, 1700)    ergocalciferol  50,000 Units Oral Q7 Days    ertapenem (INVanz) IV (PEDS and ADULTS)  1 g Intravenous Q24H    latanoprost  1 drop Both Eyes QHS    levETIRAcetam  1,000 mg Oral BID    methocarbamoL  750 mg Oral TID    mirtazapine  7.5 mg Oral QHS    multivitamin  1 tablet Oral Daily    mupirocin   Nasal BID     PRN Meds:  Current Facility-Administered Medications:     0.9% NaCl, , Intravenous, PRN    acetaminophen, 650 mg, Oral, Q6H PRN    albuterol-ipratropium, 3 mL, Nebulization, Q6H PRN    dextrose 10%, 12.5 g, Intravenous, PRN    dextrose 10%, 25 g, Intravenous, PRN    glucagon (human recombinant), 1 mg, Intramuscular, PRN    ondansetron, 4 mg, Intravenous, Q8H PRN    sodium chloride 0.9%, 10 mL, Intravenous, PRN    sodium chloride 0.9%, 10 mL, Intravenous, PRN     Review of patient's allergies indicates:   Allergen Reactions    Sulfadiazine (bulk)      Objective:     Vital Signs (Most Recent):  Temp: 99.2 °F (37.3 °C) (08/26/24 1103)  Pulse: 90 (08/26/24 1103)  Resp: 18 (08/26/24 1103)  BP: 116/75 (08/26/24 1103)  SpO2: 97 % (08/26/24 1103) Vital Signs (24h Range):  Temp:  [97.7 °F (36.5 °C)-100.9 °F (38.3 °C)] 99.2 °F (37.3 °C)  Pulse:  [77-97] 90  Resp:  [17-19] 18  SpO2:  [91 %-97 %] 97 %  BP: (105-126)/(67-84) 116/75     Weight: 98.8 kg (217 lb 13 oz)  Body mass index is 30.38 kg/m².    Intake/Output - Last 3 Shifts         08/24 0700 08/25 0659 08/25 0700 08/26 0659 08/26 0700 08/27 0659    P.O. 200 120     I.V. (mL/kg) 18.4 (0.2) 6.7 (0.1)     NG/GT       IV Piggyback 100 296.5     Total Intake(mL/kg) 318.4 (3.2) 423.2 (4.3)      Urine (mL/kg/hr) 775 (0.3) 700 (0.3) 500 (1)    Drains       Other       Stool 0 0     Total Output 775 700 500    Net -456.6 -276.8 -500           Stool Occurrence 3 x 1 x              Physical Exam  Vitals reviewed.   Cardiovascular:      Rate and Rhythm: Normal rate.   Pulmonary:      Effort: Pulmonary effort is normal. No respiratory distress.   Abdominal:      General: There is no distension.      Palpations: Abdomen is soft.      Tenderness: There is no abdominal tenderness.   Skin:     General: Skin is warm and dry.   Neurological:      Mental Status: He is alert. Mental status is at baseline.          Significant Labs:  I have reviewed all pertinent lab results within the past 24 hours.    Significant Diagnostics:  I have reviewed all pertinent imaging results/findings within the past 24 hours.

## 2024-08-26 NOTE — ASSESSMENT & PLAN NOTE
Patient has a diagnosis of pneumonia. The cause of the pneumonia is suspected to be bacterial in etiology but organism is not known. The pneumonia is improving. The patient has the following signs/symptoms of pneumonia: cough and sputum production. The patient does not have a current oxygen requirement and the patient does not have a home oxygen requirement. I have reviewed the pertinent imaging. The following cultures have been collected: Blood cultures The culture results are listed below.     Current antimicrobial regimen consists of the antibiotics listed below. Will monitor patient closely and continue current treatment plan unchanged.    Antibiotics (From admission, onward)      Start     Stop Route Frequency Ordered    08/26/24 1200  ertapenem (INVANZ) 1 g in 0.9% NaCl 100 mL IVPB (MB+)         -- IV Every 24 hours (non-standard times) 08/26/24 1053    08/22/24 2100  mupirocin 2 % ointment         08/27/24 2059 Nasl 2 times daily 08/22/24 1926            Microbiology Results (last 7 days)       Procedure Component Value Units Date/Time    Blood culture (site 2) [[id number]] Collected: 08/24/24 0824    Order Status: Completed Specimen: Blood Updated: 08/26/24 0722     Culture, Blood No Growth At 24 Hours    Blood culture (site 1) [[id number]] Collected: 08/24/24 0817    Order Status: Completed Specimen: Blood Updated: 08/26/24 0722     Culture, Blood No Growth At 24 Hours    Urine culture [[id number]]  (Abnormal)  (Susceptibility) Collected: 08/23/24 1225    Order Status: Completed Specimen: Urine, Catheterized Updated: 08/25/24 0911     Culture, Urine 60,000 Proteus mirabilis    Blood culture #1 [[id number]]  (Abnormal)  (Susceptibility) Collected: 08/22/24 0727    Order Status: Completed Specimen: Blood from Peripheral, Forearm, Right Updated: 08/25/24 0729     Culture, Blood Escherichia coli ESBL      Proteus mirabilis     Gram Stain Result Gram negative bacilli     Comment: From Aerobic Bottle         Gram  negative bacilli     Comment: From Anaerobe bottle       Blood culture #2 [[id number]]  (Abnormal)  (Susceptibility) Collected: 08/22/24 0736    Order Status: Completed Specimen: Blood Updated: 08/25/24 0725     Culture, Blood Escherichia coli ESBL      Proteus mirabilis     Gram Stain Result Gram negative bacilli     Comment: From Anaerobe bottle         Gram negative bacilli     Comment: From Aerobic Bottle       Verigene Gram-negative Blood Culture Test [[id number]]  (Abnormal) Collected: 08/22/24 0727    Order Status: Completed Specimen: Blood from Peripheral, Forearm, Right Updated: 08/23/24 0743     Verigene Result Proteus sp     Verigene Result E. coli    Narrative:      *Corrected Report*

## 2024-08-26 NOTE — PROCEDURES
Procedure performed by Mychal CARMONA under the supervision of Dr. Gale . 5 [place]ian PICC line placed in basilic vein in right upper extremity. Informed consent obtained including risks and possible complications. Patient pepped with chloro prep and draped in a sterile fashion. 2 cc 1% lidocaine infused. Utilizing U/S guidance a 45 cm 5 Mongolian PICC line placed and position verified by fluoroscopy. PICC line flushed with heparinized saline.Statlock and bandage applied. Patient tolerated procedure well with no immediate complication.

## 2024-08-26 NOTE — PROGRESS NOTES
08/26/24 1557        Wound 08/26/24 1552 Buttocks   Date First Assessed/Time First Assessed: 08/26/24 1552   Location: Buttocks   Wound Image Images linked      Skin intact, old scarring noted, healed

## 2024-08-26 NOTE — HOSPITAL COURSE
8/26 continue ertapenem for another 7 days.  This we will give 10 days.  Renal function improved.  He is at baseline.  Discussed case with Urology.  Stable for discharge.

## 2024-08-27 ENCOUNTER — PATIENT OUTREACH (OUTPATIENT)
Dept: ADMINISTRATIVE | Facility: CLINIC | Age: 69
End: 2024-08-27

## 2024-08-30 LAB
BACTERIA BLD CULT: NORMAL
BACTERIA BLD CULT: NORMAL

## 2024-08-31 ENCOUNTER — OUTSIDE PLACE OF SERVICE (OUTPATIENT)
Dept: FAMILY MEDICINE | Facility: CLINIC | Age: 69
End: 2024-08-31
Payer: MEDICARE

## 2024-08-31 PROCEDURE — 99307 SBSQ NF CARE SF MDM 10: CPT | Mod: ,,, | Performed by: FAMILY MEDICINE

## 2024-09-26 ENCOUNTER — OUTSIDE PLACE OF SERVICE (OUTPATIENT)
Dept: FAMILY MEDICINE | Facility: CLINIC | Age: 69
End: 2024-09-26
Payer: MEDICARE

## 2024-09-26 PROCEDURE — 99309 SBSQ NF CARE MODERATE MDM 30: CPT | Mod: ,,, | Performed by: FAMILY MEDICINE

## 2024-11-29 ENCOUNTER — OUTSIDE PLACE OF SERVICE (OUTPATIENT)
Dept: FAMILY MEDICINE | Facility: CLINIC | Age: 69
End: 2024-11-29
Payer: MEDICARE

## 2025-01-31 ENCOUNTER — OUTSIDE PLACE OF SERVICE (OUTPATIENT)
Dept: FAMILY MEDICINE | Facility: CLINIC | Age: 70
End: 2025-01-31
Payer: MEDICARE

## 2025-01-31 PROCEDURE — 99309 SBSQ NF CARE MODERATE MDM 30: CPT | Mod: ,,, | Performed by: FAMILY MEDICINE

## 2025-03-20 ENCOUNTER — OUTSIDE PLACE OF SERVICE (OUTPATIENT)
Dept: FAMILY MEDICINE | Facility: CLINIC | Age: 70
End: 2025-03-20
Payer: MEDICARE

## 2025-05-14 ENCOUNTER — OUTSIDE PLACE OF SERVICE (OUTPATIENT)
Dept: FAMILY MEDICINE | Facility: CLINIC | Age: 70
End: 2025-05-14
Payer: MEDICARE

## 2025-05-14 PROCEDURE — 99309 SBSQ NF CARE MODERATE MDM 30: CPT | Mod: ,,, | Performed by: FAMILY MEDICINE

## 2025-07-27 ENCOUNTER — OUTSIDE PLACE OF SERVICE (OUTPATIENT)
Dept: FAMILY MEDICINE | Facility: CLINIC | Age: 70
End: 2025-07-27
Payer: MEDICARE

## 2025-07-27 PROCEDURE — 99309 SBSQ NF CARE MODERATE MDM 30: CPT | Mod: ,,, | Performed by: FAMILY MEDICINE
